# Patient Record
Sex: FEMALE | Race: WHITE | Employment: UNEMPLOYED | URBAN - NONMETROPOLITAN AREA
[De-identification: names, ages, dates, MRNs, and addresses within clinical notes are randomized per-mention and may not be internally consistent; named-entity substitution may affect disease eponyms.]

---

## 2018-04-18 ENCOUNTER — HOSPITAL ENCOUNTER (OUTPATIENT)
Dept: WOUND CARE | Age: 63
Discharge: HOME | End: 2018-04-18
Attending: NURSE PRACTITIONER | Admitting: NURSE PRACTITIONER

## 2018-04-18 VITALS
SYSTOLIC BLOOD PRESSURE: 207 MMHG | DIASTOLIC BLOOD PRESSURE: 79 MMHG | BODY MASS INDEX: 57.52 KG/M2 | HEART RATE: 80 BPM | HEIGHT: 60 IN | TEMPERATURE: 97 F | RESPIRATION RATE: 18 BRPM | WEIGHT: 293 LBS

## 2018-04-18 DIAGNOSIS — L97.921 CHRONIC ULCER OF LEFT LEG, LIMITED TO BREAKDOWN OF SKIN (HCC): ICD-10-CM

## 2018-04-18 DIAGNOSIS — I89.0 LYMPHEDEMA OF BOTH LOWER EXTREMITIES: ICD-10-CM

## 2018-04-18 PROCEDURE — 99203 OFFICE O/P NEW LOW 30 MIN: CPT | Performed by: NURSE PRACTITIONER

## 2018-04-25 ENCOUNTER — HOSPITAL ENCOUNTER (OUTPATIENT)
Dept: WOUND CARE | Age: 63
Discharge: HOME | End: 2018-04-25
Attending: NURSE PRACTITIONER | Admitting: NURSE PRACTITIONER

## 2018-04-25 VITALS
RESPIRATION RATE: 22 BRPM | TEMPERATURE: 97.6 F | DIASTOLIC BLOOD PRESSURE: 93 MMHG | HEART RATE: 66 BPM | SYSTOLIC BLOOD PRESSURE: 179 MMHG

## 2018-04-25 DIAGNOSIS — I89.0 LYMPHEDEMA OF BOTH LOWER EXTREMITIES: ICD-10-CM

## 2018-04-25 DIAGNOSIS — L97.921 CHRONIC ULCER OF LEFT LEG, LIMITED TO BREAKDOWN OF SKIN (HCC): Primary | ICD-10-CM

## 2018-04-25 PROCEDURE — 99213 OFFICE O/P EST LOW 20 MIN: CPT | Performed by: NURSE PRACTITIONER

## 2018-04-25 RX ORDER — ATENOLOL 25 MG/1
25 TABLET ORAL DAILY
COMMUNITY

## 2018-04-25 RX ORDER — IRBESARTAN 75 MG/1
75 TABLET ORAL NIGHTLY
COMMUNITY
End: 2019-06-12

## 2018-04-25 RX ORDER — LEVOTHYROXINE SODIUM 0.05 MG/1
50 TABLET ORAL DAILY
COMMUNITY

## 2018-04-25 RX ORDER — GLIPIZIDE 5 MG/1
5 TABLET ORAL
COMMUNITY

## 2018-04-25 RX ORDER — WARFARIN SODIUM 4 MG/1
5 TABLET ORAL
COMMUNITY

## 2018-04-25 ASSESSMENT — PAIN DESCRIPTION - PAIN TYPE: TYPE: ACUTE PAIN

## 2018-04-25 ASSESSMENT — PAIN SCALES - GENERAL: PAINLEVEL_OUTOF10: 8

## 2018-04-25 ASSESSMENT — PAIN DESCRIPTION - DESCRIPTORS: DESCRIPTORS: SHARP;THROBBING

## 2018-04-25 ASSESSMENT — PAIN DESCRIPTION - LOCATION: LOCATION: LEG

## 2018-04-25 ASSESSMENT — PAIN DESCRIPTION - ORIENTATION: ORIENTATION: INNER;LEFT;UPPER

## 2018-04-30 LAB
CULTURE: NORMAL
ORGANISM: NORMAL
ORGANISM: NORMAL
REPORT STATUS: NORMAL
REQUEST PROBLEM: NORMAL
SPECIMEN: NORMAL

## 2018-05-02 ENCOUNTER — HOSPITAL ENCOUNTER (OUTPATIENT)
Dept: WOUND CARE | Age: 63
Discharge: HOME | End: 2018-05-31
Attending: NURSE PRACTITIONER | Admitting: NURSE PRACTITIONER

## 2018-05-02 RX ORDER — SULFAMETHOXAZOLE AND TRIMETHOPRIM 800; 160 MG/1; MG/1
1 TABLET ORAL 2 TIMES DAILY
COMMUNITY
End: 2018-05-16

## 2018-05-09 ENCOUNTER — HOSPITAL ENCOUNTER (OUTPATIENT)
Dept: WOUND CARE | Age: 63
Discharge: HOME | End: 2018-05-09
Attending: NURSE PRACTITIONER | Admitting: NURSE PRACTITIONER

## 2018-05-09 VITALS
SYSTOLIC BLOOD PRESSURE: 179 MMHG | RESPIRATION RATE: 22 BRPM | DIASTOLIC BLOOD PRESSURE: 84 MMHG | HEART RATE: 73 BPM | TEMPERATURE: 98.4 F

## 2018-05-09 DIAGNOSIS — I89.0 LYMPHEDEMA OF BOTH LOWER EXTREMITIES: ICD-10-CM

## 2018-05-09 DIAGNOSIS — L97.921 CHRONIC ULCER OF LEFT LEG, LIMITED TO BREAKDOWN OF SKIN (HCC): Primary | ICD-10-CM

## 2018-05-09 PROCEDURE — 99213 OFFICE O/P EST LOW 20 MIN: CPT | Performed by: NURSE PRACTITIONER

## 2018-05-16 ENCOUNTER — HOSPITAL ENCOUNTER (OUTPATIENT)
Dept: WOUND CARE | Age: 63
Discharge: HOME | End: 2018-05-16
Attending: NURSE PRACTITIONER | Admitting: NURSE PRACTITIONER

## 2018-05-16 VITALS
HEART RATE: 65 BPM | SYSTOLIC BLOOD PRESSURE: 179 MMHG | TEMPERATURE: 96.4 F | RESPIRATION RATE: 22 BRPM | DIASTOLIC BLOOD PRESSURE: 96 MMHG

## 2018-05-16 DIAGNOSIS — L97.921 CHRONIC ULCER OF LEFT LEG, LIMITED TO BREAKDOWN OF SKIN (HCC): Primary | ICD-10-CM

## 2018-05-16 DIAGNOSIS — I89.0 LYMPHEDEMA OF BOTH LOWER EXTREMITIES: ICD-10-CM

## 2018-05-16 PROCEDURE — 99213 OFFICE O/P EST LOW 20 MIN: CPT | Performed by: NURSE PRACTITIONER

## 2018-05-23 ENCOUNTER — HOSPITAL ENCOUNTER (OUTPATIENT)
Dept: WOUND CARE | Age: 63
Discharge: HOME | End: 2018-05-23
Attending: NURSE PRACTITIONER | Admitting: NURSE PRACTITIONER

## 2018-05-23 VITALS
HEART RATE: 60 BPM | TEMPERATURE: 97.4 F | SYSTOLIC BLOOD PRESSURE: 154 MMHG | DIASTOLIC BLOOD PRESSURE: 88 MMHG | RESPIRATION RATE: 22 BRPM

## 2018-05-23 DIAGNOSIS — L97.921 CHRONIC ULCER OF LEFT LEG, LIMITED TO BREAKDOWN OF SKIN (HCC): Primary | ICD-10-CM

## 2018-05-23 DIAGNOSIS — I89.0 LYMPHEDEMA OF BOTH LOWER EXTREMITIES: ICD-10-CM

## 2018-05-23 PROCEDURE — 99213 OFFICE O/P EST LOW 20 MIN: CPT | Performed by: NURSE PRACTITIONER

## 2018-06-06 ENCOUNTER — HOSPITAL ENCOUNTER (OUTPATIENT)
Dept: WOUND CARE | Age: 63
Discharge: HOME | End: 2018-06-06
Attending: SURGERY | Admitting: SURGERY

## 2018-06-06 VITALS
DIASTOLIC BLOOD PRESSURE: 105 MMHG | HEART RATE: 67 BPM | RESPIRATION RATE: 20 BRPM | TEMPERATURE: 97.4 F | SYSTOLIC BLOOD PRESSURE: 178 MMHG

## 2018-06-06 DIAGNOSIS — I89.0 LYMPHEDEMA OF BOTH LOWER EXTREMITIES: Primary | ICD-10-CM

## 2018-06-06 DIAGNOSIS — L97.921 CHRONIC ULCER OF LEFT LEG, LIMITED TO BREAKDOWN OF SKIN (HCC): ICD-10-CM

## 2019-06-12 ENCOUNTER — HOSPITAL ENCOUNTER (OUTPATIENT)
Dept: WOUND CARE | Age: 64
Discharge: HOME | End: 2019-06-12
Payer: MEDICARE

## 2019-06-12 VITALS
WEIGHT: 293 LBS | SYSTOLIC BLOOD PRESSURE: 137 MMHG | HEIGHT: 60 IN | RESPIRATION RATE: 22 BRPM | BODY MASS INDEX: 57.52 KG/M2 | TEMPERATURE: 97.4 F | HEART RATE: 61 BPM | DIASTOLIC BLOOD PRESSURE: 66 MMHG

## 2019-06-12 DIAGNOSIS — L97.122 CHRONIC ULCER OF LEFT THIGH WITH FAT LAYER EXPOSED (HCC): ICD-10-CM

## 2019-06-12 DIAGNOSIS — L89.893 PRESSURE ULCER OF LOWER EXTREMITY, STAGE 3 (HCC): ICD-10-CM

## 2019-06-12 DIAGNOSIS — L97.112 CHRONIC ULCER OF RIGHT THIGH WITH FAT LAYER EXPOSED (HCC): ICD-10-CM

## 2019-06-12 DIAGNOSIS — E66.01 MORBID OBESITY (HCC): ICD-10-CM

## 2019-06-12 PROCEDURE — 99203 OFFICE O/P NEW LOW 30 MIN: CPT | Performed by: SURGERY

## 2019-06-12 PROCEDURE — 99213 OFFICE O/P EST LOW 20 MIN: CPT

## 2019-06-12 RX ORDER — LOSARTAN POTASSIUM 25 MG/1
50 TABLET ORAL 2 TIMES DAILY
COMMUNITY

## 2019-06-12 ASSESSMENT — PAIN DESCRIPTION - DESCRIPTORS: DESCRIPTORS: SORE

## 2019-06-12 ASSESSMENT — PAIN DESCRIPTION - LOCATION: LOCATION: LEG

## 2019-06-12 ASSESSMENT — PAIN DESCRIPTION - PAIN TYPE: TYPE: ACUTE PAIN

## 2019-06-12 ASSESSMENT — PAIN DESCRIPTION - ORIENTATION: ORIENTATION: LEFT;RIGHT;POSTERIOR

## 2019-06-12 ASSESSMENT — PAIN SCALES - GENERAL: PAINLEVEL_OUTOF10: 4

## 2019-06-12 ASSESSMENT — PAIN DESCRIPTION - FREQUENCY: FREQUENCY: INTERMITTENT

## 2019-06-13 NOTE — PROGRESS NOTES
FAMILY HISTORY    Family History   Problem Relation Age of Onset    Coronary Art Dis Father     Stroke Father     Coronary Art Dis Brother     Stroke Brother     Diabetes Brother     High Blood Pressure Brother        SOCIAL HISTORY    Social History     Tobacco Use    Smoking status: Never Smoker    Smokeless tobacco: Never Used   Substance Use Topics    Alcohol use: No    Drug use: No       ALLERGIES    Allergies   Allergen Reactions    Asa [Aspirin]     Celebrex [Celecoxib]     Ciprofloxacin     Lisinopril     Nabumetone     Naproxen     Nsaids     Vancomycin     Vioxx [Rofecoxib]        MEDICATIONS    Current Outpatient Medications on File Prior to Encounter   Medication Sig Dispense Refill    losartan (COZAAR) 25 MG tablet Take 25 mg by mouth daily      levothyroxine (SYNTHROID) 50 MCG tablet Take 50 mcg by mouth Daily      atenolol (TENORMIN) 25 MG tablet Take 25 mg by mouth daily      glipiZIDE (GLUCOTROL) 5 MG tablet Take 5 mg by mouth 2 times daily (before meals)      warfarin (COUMADIN) 4 MG tablet Take 3 mg by mouth        No current facility-administered medications on file prior to encounter. PROBLEM LIST    Patient Active Problem List   Diagnosis    WD - Lymphedema of both lower extremities    WD-Chronic ulcer of left leg, limited to breakdown of skin ([address]    WD - Chronic ulcer of left thigh with fat layer exposed ([address]    WD - Chronic ulcer of right thigh with fat layer exposed ([address]    Morbid obesity ([address]    WD - Pressure ulcer of lower extremity, stage 3 ([address]       REVIEW OF SYSTEMS    Pertinent items are noted in HPI.       Objective:      /66   Pulse 61   Temp 97.4 °F (36.3 °C) (Temporal)   Resp 22   Ht 5' (1.524 m)   Wt (!) 371 lb (168.3 kg)   BMI 72.46 kg/m²     PHYSICAL EXAM    General Appearance:   Alert, cooperative, no distress    Head:   Normocephalic, without obvious abnormality, atraumatic    Eyes:   PERRL, conjunctiva/corneas clear Ears:   Normal external appearance, hearing grossly intact    Nose:  Nares normal, mucosa normal, no drainage    Neck:  Supple, trachea midline    Respiratory:   Equal chest rise, respirations unlabored, no wheezing   Cardiovascular:   Regular rate and rhythm    Abdomen, GI:   Soft, non-tender, no rebound or guarding, morbidly obese    Musculoskeletal:  Marked lymphedema and obesity of the bilateral lower extremities. Scar over the left knee. Neurologic:  Nonfocal, strength & sensation grossly intact   Psychiatric: Oriented x3, grossly appropriate       Dermatologic exam: Visual inspection of the periwound reveals the skin to be coarse, atrophic and hyperpigmented  Wound exam: see wound description below in procedure note      Assessment:       Crista Wells  appears to have a non-healing wound of the left lower leg and bilateral upper posterior thighs. The etiology of the wound is felt to be pressure and lymphedema. There are multiple complicating factors including edema, lymphedema, diabetes, chronic pressure, decreased mobility, shear force, obesity and tape sensitivity. A comprehensive wound management program would be helpful to heal this wound. Assessments completed include fall risk and nutritional, functional,and psychological status. At this time appropriate care would include: periodic debridement and wound care as below. Problem List Items Addressed This Visit     WD - Chronic ulcer of left thigh with fat layer exposed ([address]    WD - Chronic ulcer of right thigh with fat layer exposed ([address]    Morbid obesity ([address]    WD - Pressure ulcer of lower extremity, stage 3 ([address]          Plan:   Stop using the hydrocortisone cream. Use a silicone based dressing to avoid tape sensitivity. Will try local wound treatment, potentially consider lymphedema therapy if not responding to treatment.     Discharge instructions:    Discharge Instructions       PHYSICIAN ORDERS AND DISCHARGE INSTRUCTIONS    NOTE: Upon discharge from the [address], you will receive a patient experience survey. We would be grateful if you would take the time to fill this survey out. Wound care order history:     CARROL's   Right       Left    Date not able to obtain   Vascular studies:   Date    Imaging:   Date    Cultures:   Date ???   Labs/ HbA1c:   Date    Grafts:  Date    HBO:  To be determined   Antibiotics:               Earlier Wound care treatments:                Authorizations:      Consults:   Date     PCP: Johana Arevalo    Continuing wound care orders and information:              Residence: home              Continue home health care with: [place]   Your wound-care supplies will be provided by: Mirian MORALES provider:   Compression with sister helping sister to obtain a pair at this time 6/12/19   Off loading:  Date    Wound Meds:    Wound cleansing:      Do not scrub or use excessive force. Wash hands with soap and water before and after dressing changes. Prior to applying a clean dressing, cleanse wound with normal saline,    wound cleanser, or mild soap and water. Ask your physician or nurse before getting the wound(s) wet in the shower. Daily Wound management:    Keep weight off wounds and reposition every 2 hours. Avoid standing for long periods of time. Apply wraps/stockings in AM and remove at bedtime. Elevate legs to the level of the heart or above for 30 minutes 4-5 times a day and/or when sitting. When taking antibiotics take entire prescription as ordered by MD do not stop taking until medicine is all gone.            Orders for this week: 6/12/19     Left posterior leg and left lower buttocks--apply medihoney, ca alginate, mepilex border or Keramax dressing--change dressing every other day    Left and right thigh--apply desitin or barrier cream to areas, cover with mepilex border or keramax dressing (silicone adhesive dressing)--change dressing every other day and as needed or loose or soiled dressing    Consider lymph pumps in the near future    Work on getting gel cushion for recliner---in the mean time please try pillow to recliner    ----homecare to order border dressings please---      Follow up with Dr Lopez Bolton  In 1 week in the wound care center    Call [phone] for any questions or concerns.     Date__________   Time____________                Treatment Note      Written Patient Dismissal Instructions Given            Electronically signed by Eric Mccann MD on 6/12/2019 at 8:36 PM

## 2019-06-19 ENCOUNTER — HOSPITAL ENCOUNTER (OUTPATIENT)
Dept: WOUND CARE | Age: 64
Discharge: HOME | End: 2019-06-19
Payer: MEDICARE

## 2019-06-19 VITALS — RESPIRATION RATE: 20 BRPM | TEMPERATURE: 97.6 F

## 2019-06-19 DIAGNOSIS — E66.01 MORBID OBESITY (HCC): ICD-10-CM

## 2019-06-19 DIAGNOSIS — L89.893 PRESSURE ULCER OF LOWER EXTREMITY, STAGE 3 (HCC): ICD-10-CM

## 2019-06-19 DIAGNOSIS — I89.0 LYMPHEDEMA OF BOTH LOWER EXTREMITIES: Primary | ICD-10-CM

## 2019-06-19 PROCEDURE — 87073 CULTURE BACTERIA ANAEROBIC: CPT

## 2019-06-19 PROCEDURE — 87186 SC STD MICRODIL/AGAR DIL: CPT

## 2019-06-19 PROCEDURE — 99212 OFFICE O/P EST SF 10 MIN: CPT | Performed by: NURSE PRACTITIONER

## 2019-06-19 PROCEDURE — 87071 CULTURE AEROBIC QUANT OTHER: CPT

## 2019-06-19 PROCEDURE — 87077 CULTURE AEROBIC IDENTIFY: CPT

## 2019-06-19 PROCEDURE — 99213 OFFICE O/P EST LOW 20 MIN: CPT

## 2019-06-19 ASSESSMENT — PAIN DESCRIPTION - DESCRIPTORS: DESCRIPTORS: SORE

## 2019-06-19 ASSESSMENT — PAIN DESCRIPTION - PAIN TYPE: TYPE: ACUTE PAIN

## 2019-06-19 ASSESSMENT — PAIN SCALES - GENERAL: PAINLEVEL_OUTOF10: 2

## 2019-06-19 ASSESSMENT — PAIN DESCRIPTION - FREQUENCY: FREQUENCY: INTERMITTENT

## 2019-06-19 ASSESSMENT — PAIN DESCRIPTION - ORIENTATION: ORIENTATION: RIGHT;POSTERIOR

## 2019-06-19 ASSESSMENT — PAIN DESCRIPTION - LOCATION: LOCATION: BUTTOCKS

## 2019-06-19 NOTE — PROGRESS NOTES
Wound Care Center Progress Note       Acey Kocher  AGE: 59 y.o. GENDER: female  : 1955  TODAY'S DATE:  2019        Subjective:     Chief Complaint   Patient presents with    Wound Check     buttock          HISTORY of PRESENT ILLNESS     Acey Kocher is a 59 y.o. female who presents today for wound evaluation of Chronic pressure and lymphedema ulcer(s) of left posterior lower leg. The ulcer is of moderate severity. The underlying cause of the wound is lymphedema and pressure. Attempted to drain hematoma to right posterior thigh and large amount of blood was expressed. Bleeding stopped by pressure. There is a hematoma to the left posterior thigh which is larger than one on right but given bleeding to right posterior thigh will monitor for now and allow for reabsorption. Patient is on Coumadin and bleeds easily. Wound Pain Timing/Severity: none  Quality of pain: N/A  Severity of pain:  0 / 10   Modifying Factors: lymphedema, diabetes, chronic pressure, decreased mobility and obesity  Associated Signs/Symptoms: edema and drainage        PAST MEDICAL HISTORY        Diagnosis Date    Cellulitis     Diabetes mellitus ([address]     Gallbladder disease     Gallbladder sludge     Hx of blood clots     Lymphedema     Pulmonary embolism (HCC)     Renal failure     Saddle embolus     Thrombosis        PAST SURGICAL HISTORY    Past Surgical History:   Procedure Laterality Date    CARDIAC SURGERY      IVC filter placement     COSMETIC SURGERY      JOINT REPLACEMENT      Bilateral partial knee        FAMILY HISTORY    Family History   Problem Relation Age of Onset    Coronary Art Dis Father     Stroke Father     Coronary Art Dis Brother     Stroke Brother     Diabetes Brother     High Blood Pressure Brother        SOCIAL HISTORY    Social History     Tobacco Use    Smoking status: Never Smoker    Smokeless tobacco: Never Used   Substance Use Topics    Alcohol use: No    Drug use:  No Healing % -29 6/19/2019  9:51 AM   Distance Tunneling (cm) 0 cm 6/19/2019  9:51 AM   Tunneling Position ___ O'Clock 0 6/19/2019  9:51 AM   Undermining Starts ___ O'Clock 0 6/19/2019  9:51 AM   Undermining Ends___ O'Clock 0 6/19/2019  9:51 AM   Undermining Maxium Distance (cm) 0 6/19/2019  9:51 AM   Wound Assessment Red 6/19/2019  9:51 AM   Drainage Amount Moderate 6/19/2019  9:51 AM   Drainage Description Serosanguinous 6/19/2019  9:51 AM   Odor None 6/19/2019  9:51 AM   Margins Defined edges 6/19/2019  9:51 AM   Vera-wound Assessment Pink 6/19/2019  9:51 AM   Non-staged Wound Description Full thickness 6/19/2019  9:51 AM   [place]%Wound Bed 0 6/19/2019  9:51 AM   Red%Wound Bed 100 6/19/2019  9:51 AM   Yellow%Wound Bed 0 6/19/2019  9:51 AM   Black%Wound Bed 0 6/19/2019  9:51 AM   Purple%Wound Bed 0 6/19/2019  9:51 AM   Other%Wound Bed 0 6/19/2019  9:51 AM   Number of days: 6       Wound 06/12/19 #3 Left lower buttocks pressure stage III (Active)   Wound Image   6/12/2019  1:04 PM   Wound Pressure Stage  3 6/19/2019  9:51 AM   Dressing Status Clean;Dry; Intact 6/12/2019  2:30 PM   Dressing Changed Changed/New 6/12/2019  2:30 PM   Wound Cleansed Rinsed/Irrigated with saline 6/19/2019  9:51 AM   Wound Length (cm) 0 cm 6/19/2019  9:51 AM   Wound Width (cm) 0 cm 6/19/2019  9:51 AM   Wound Depth (cm) 0 cm 6/19/2019  9:51 AM   Wound Surface Area (cm^2) 0 cm^2 6/19/2019  9:51 AM   Change in Wound Size % (l*w) 100 6/19/2019  9:51 AM   Wound Volume (cm^3) 0 cm^3 6/19/2019  9:51 AM   Wound Healing % 100 6/19/2019  9:51 AM   Distance Tunneling (cm) 0 cm 6/19/2019  9:51 AM   Tunneling Position ___ O'Clock 0 6/19/2019  9:51 AM   Undermining Starts ___ O'Clock 0 6/19/2019  9:51 AM   Undermining Ends___ O'Clock 0 6/19/2019  9:51 AM   Undermining Maxium Distance (cm) 0 6/19/2019  9:51 AM   Wound Assessment Purple 6/19/2019  9:51 AM   Drainage Amount None 6/19/2019  9:51 AM   Odor None 6/19/2019  9:51 AM   Margins Undefined edges 6/19/2019  9:51 AM   Vera-wound Assessment Clean;Dry 6/19/2019  9:51 AM   Non-staged Wound Description Not applicable 9/76/1455  6:74 AM   [place]%Wound Bed 0 6/19/2019  9:51 AM   Red%Wound Bed 0 6/19/2019  9:51 AM   Yellow%Wound Bed 0 6/19/2019  9:51 AM   Black%Wound Bed 0 6/19/2019  9:51 AM   Purple%Wound Bed 100 6/19/2019  9:51 AM   Other%Wound Bed 0 6/19/2019  9:51 AM   Number of days: 6       Wound 06/12/19 #4 left posterior thigh --pressure stage III (Active)   Wound Image   6/12/2019  1:04 PM   Wound Pressure Stage  3 6/19/2019  9:51 AM   Dressing Status Clean;Dry; Intact 6/12/2019  2:30 PM   Dressing Changed Changed/New 6/12/2019  2:30 PM   Wound Cleansed Rinsed/Irrigated with saline 6/19/2019  9:51 AM   Wound Length (cm) 0 cm 6/19/2019  9:51 AM   Wound Width (cm) 0 cm 6/19/2019  9:51 AM   Wound Depth (cm) 0 cm 6/19/2019  9:51 AM   Wound Surface Area (cm^2) 0 cm^2 6/19/2019  9:51 AM   Change in Wound Size % (l*w) 100 6/19/2019  9:51 AM   Wound Volume (cm^3) 0 cm^3 6/19/2019  9:51 AM   Wound Healing % 100 6/19/2019  9:51 AM   Distance Tunneling (cm) 0 cm 6/19/2019  9:51 AM   Tunneling Position ___ O'Clock 0 6/19/2019  9:51 AM   Undermining Starts ___ O'Clock 0 6/19/2019  9:51 AM   Undermining Ends___ O'Clock 0 6/19/2019  9:51 AM   Undermining Maxium Distance (cm) 0 6/19/2019  9:51 AM   Wound Assessment Purple 6/19/2019  9:51 AM   Drainage Amount None 6/19/2019  9:51 AM   Drainage Description Serosanguinous 6/12/2019  1:04 PM   Odor None 6/19/2019  9:51 AM   Margins Undefined edges 6/19/2019  9:51 AM   Vera-wound Assessment Clean;Dry 6/19/2019  9:51 AM   Non-staged Wound Description Not applicable 2/67/9413  1:72 AM   [place]%Wound Bed 0 6/19/2019  9:51 AM   Red%Wound Bed 0 6/19/2019  9:51 AM   Yellow%Wound Bed 0 6/19/2019  9:51 AM   Black%Wound Bed 0 6/19/2019  9:51 AM   Purple%Wound Bed 100 6/19/2019  9:51 AM   Other%Wound Bed 0 6/12/2019  1:04 PM   Number of days: 6       Wound 06/12/19 #5 right posterior thigh --pressure stage III (Active)   Wound Image   6/12/2019  1:04 PM   Wound Pressure Stage  3 6/19/2019  9:51 AM   Dressing Status Clean;Dry; Intact 6/12/2019  2:30 PM   Dressing Changed Changed/New 6/12/2019  2:30 PM   Wound Cleansed Rinsed/Irrigated with saline 6/12/2019  1:04 PM   Wound Length (cm) 0 cm 6/19/2019  9:51 AM   Wound Width (cm) 0 cm 6/19/2019  9:51 AM   Wound Depth (cm) 0 cm 6/19/2019  9:51 AM   Wound Surface Area (cm^2) 0 cm^2 6/19/2019  9:51 AM   Change in Wound Size % (l*w) 100 6/19/2019  9:51 AM   Wound Volume (cm^3) 0 cm^3 6/19/2019  9:51 AM   Wound Healing % 100 6/19/2019  9:51 AM   Distance Tunneling (cm) 0 cm 6/19/2019  9:51 AM   Tunneling Position ___ O'Clock 0 6/19/2019  9:51 AM   Undermining Starts ___ O'Clock 0 6/19/2019  9:51 AM   Undermining Ends___ O'Clock 0 6/19/2019  9:51 AM   Undermining Maxium Distance (cm) 0 6/19/2019  9:51 AM   Wound Assessment Purple;Red 6/19/2019  9:51 AM   Drainage Amount None 6/19/2019  9:51 AM   Drainage Description Serosanguinous 6/12/2019  1:04 PM   Odor None 6/19/2019  9:51 AM   Margins Attached edges 6/19/2019  9:51 AM   Vera-wound Assessment Clean;Dry;Pink 6/19/2019  9:51 AM   Non-staged Wound Description Full thickness 6/19/2019  9:51 AM   [place]%Wound Bed 0 6/19/2019  9:51 AM   Red%Wound Bed 50 6/19/2019  9:51 AM   Yellow%Wound Bed 0 6/19/2019  9:51 AM   Black%Wound Bed 0 6/19/2019  9:51 AM   Purple%Wound Bed 50 6/19/2019  9:51 AM   Other%Wound Bed 0 6/19/2019  9:51 AM   Number of days: 6       Assessment:       Problem List Items Addressed This Visit     WD - Lymphedema of both lower extremities - Primary    Morbid obesity ([address]    WD - Pressure ulcer of lower extremity, stage 3 ([facility]    Relevant Orders    WOUND CULTURE          Status of wound progress and description from last visit:   Improving, several areas are closed. She continues to have open area to the left posterior lower leg.         Plan:     Discharge Instructions            Plan:   Stop using the hydrocortisone cream. Use a silicone based dressing to avoid tape sensitivity. Will try local wound treatment, potentially consider lymphedema therapy if not responding to treatment.     Discharge instructions:     Discharge Instructions        PHYSICIAN ORDERS AND DISCHARGE INSTRUCTIONS     NOTE: Upon discharge from the [address], you will receive a patient experience survey. We would be grateful if you would take the time to fill this survey out.     Wound care order history:                 CARROL's   Right       Left               Date not able to obtain              Vascular studies:   Date               Imaging:   Date               Cultures:   Date ? ??              Labs/ HbA1c:   Date               Grafts:  Date               HBO:  To be determined              Antibiotics:               Earlier Wound care treatments:                Authorizations:                        Consults:   Date                           PCP: Joseph     Continuing wound care orders and information:              Residence: home              Continue home health care with: [place]              Your wound-care supplies will be provided by: Alicia MORALES provider:              Compression with sister helping sister to obtain a pair at this time 6/12/19              Off loading:  Date               Wound Meds:              Wound cleansing:                           Do not scrub or use excessive force. Wash hands with soap and water before and after dressing changes. Prior to applying a clean dressing, cleanse wound with normal saline,                          wound cleanser, or mild soap and water. Ask your physician or nurse before getting the wound(s) wet in the shower. Daily Wound management:                          Keep weight off wounds and reposition every 2 hours. Avoid standing for long periods of time. Apply wraps/stockings in AM and remove at bedtime. Elevate legs to the level of the heart or above for 30 minutes 4-5 times a day and/or when sitting.                                                When taking antibiotics take entire prescription as ordered by MD do not stop taking until medicine is all gone.                                                                 Orders for this week: 6/19/19                 Left posterior leg --apply medihoney, ca alginate, mepilex border or Keramax dressing--change dressing every other day     Left and right thigh--apply desitin or barrier cream to areas, cover with mepilex border or keramax dressing (silicone adhesive dressing)--change dressing every other day and as needed or loose or soiled dressing     Consider lymph pumps in the near future     Culture obtained left leg 6/19/19  Work on getting gel cushion for recliner---in the mean time please try pillow to recliner     ----homecare to order border dressings please---        Follow up with Nayana DAS In 2 weeks in the wound care center     Call [phone] for any questions or concerns.     Date__________   Time____________                          Treatment Note      Written Patient Dismissal Instructions Given            Electronically signed by MIA Coulter CNP on 6/19/2019 at 10:32 AM

## 2019-06-22 LAB
CULTURE: ABNORMAL
Lab: ABNORMAL
SPECIMEN: ABNORMAL

## 2019-07-03 ENCOUNTER — HOSPITAL ENCOUNTER (OUTPATIENT)
Dept: WOUND CARE | Age: 64
Discharge: HOME | End: 2019-07-03
Payer: MEDICARE

## 2019-07-03 VITALS
DIASTOLIC BLOOD PRESSURE: 78 MMHG | HEART RATE: 55 BPM | TEMPERATURE: 97.4 F | SYSTOLIC BLOOD PRESSURE: 153 MMHG | RESPIRATION RATE: 20 BRPM

## 2019-07-03 DIAGNOSIS — I89.0 LYMPHEDEMA OF BOTH LOWER EXTREMITIES: ICD-10-CM

## 2019-07-03 DIAGNOSIS — L89.893 PRESSURE ULCER OF LOWER EXTREMITY, STAGE 3 (HCC): ICD-10-CM

## 2019-07-03 DIAGNOSIS — E66.01 MORBID OBESITY (HCC): Primary | ICD-10-CM

## 2019-07-03 PROCEDURE — 99212 OFFICE O/P EST SF 10 MIN: CPT | Performed by: NURSE PRACTITIONER

## 2019-07-03 PROCEDURE — 99213 OFFICE O/P EST LOW 20 MIN: CPT

## 2019-07-03 RX ORDER — DOXYCYCLINE HYCLATE 100 MG
100 TABLET ORAL 2 TIMES DAILY
Qty: 20 TABLET | Refills: 0 | Status: SHIPPED | OUTPATIENT
Start: 2019-07-03 | End: 2019-07-13

## 2019-07-10 ENCOUNTER — HOSPITAL ENCOUNTER (OUTPATIENT)
Dept: WOUND CARE | Age: 64
Discharge: HOME | End: 2019-07-10
Payer: MEDICARE

## 2019-07-10 VITALS
DIASTOLIC BLOOD PRESSURE: 85 MMHG | SYSTOLIC BLOOD PRESSURE: 143 MMHG | TEMPERATURE: 97.2 F | HEART RATE: 57 BPM | RESPIRATION RATE: 18 BRPM

## 2019-07-10 DIAGNOSIS — I89.0 LYMPHEDEMA OF BOTH LOWER EXTREMITIES: ICD-10-CM

## 2019-07-10 DIAGNOSIS — L89.893 PRESSURE ULCER OF LOWER EXTREMITY, STAGE 3 (HCC): ICD-10-CM

## 2019-07-10 DIAGNOSIS — E66.01 MORBID OBESITY (HCC): Primary | ICD-10-CM

## 2019-07-10 PROCEDURE — 99213 OFFICE O/P EST LOW 20 MIN: CPT

## 2019-07-10 PROCEDURE — 99212 OFFICE O/P EST SF 10 MIN: CPT | Performed by: NURSE PRACTITIONER

## 2019-07-10 ASSESSMENT — PAIN SCALES - GENERAL: PAINLEVEL_OUTOF10: 0

## 2019-07-10 NOTE — PROGRESS NOTES
[facility]              Your wound-care supplies will be provided by: Alexus Tilley provider:  Meliton Davis with sister helping sister to obtain a pair at this time 6/12/19              Off loading:  Date               AGSGY Meds:              JOSE MU cleansing:                           BL not scrub or use excessive force.                          Wash hands with soap and water before and after dressing changes.                         Prior to applying a clean dressing, cleanse wound with normal saline,                          wound cleanser, or mild soap and water.                           Ask your physician or nurse before getting the wound(s) wet in the shower.              Daily Wound management:                          Keep weight off wounds and reposition every 2 hours.                          Avoid standing for long periods of time.                          Apply wraps/stockings in AM and remove at bedtime.                          Elevate legs to the level of the heart or above for 30 minutes 4-5 times a day and/or when sitting.                                               When taking antibiotics take entire prescription as ordered by MD do not stop taking until medicine is all gone.                                                                 Orders for this week: 7/10/19                 Left posterior leg --bactroban to wound bed then pack to depth of 3.5 cm with damp sorbact rope, cover with  Ag, alginate, cover with abd pad and wrap with conform and tape. --change dressing every day     Consider lymph pumps in the near future     Culture obtained left leg 6/19/19.  Started on Bactrim DS   Work on getting gel cushion for recliner---in the mean time please try pillow to recliner     ----homecare to order border dressings please---        Follow up with Nayana DAS In 2 week in the wound care center     Call [phone] for any questions or concerns.     Date__________

## 2019-07-24 ENCOUNTER — HOSPITAL ENCOUNTER (OUTPATIENT)
Dept: WOUND CARE | Age: 64
Discharge: HOME | End: 2019-07-24
Payer: MEDICARE

## 2019-07-24 VITALS — RESPIRATION RATE: 18 BRPM | SYSTOLIC BLOOD PRESSURE: 164 MMHG | DIASTOLIC BLOOD PRESSURE: 99 MMHG | TEMPERATURE: 96.8 F

## 2019-07-24 DIAGNOSIS — I89.0 LYMPHEDEMA OF BOTH LOWER EXTREMITIES: Primary | ICD-10-CM

## 2019-07-24 DIAGNOSIS — L89.893 PRESSURE ULCER OF LOWER EXTREMITY, STAGE 3 (HCC): ICD-10-CM

## 2019-07-24 PROBLEM — L97.921 CHRONIC ULCER OF LEFT LEG, LIMITED TO BREAKDOWN OF SKIN (HCC): Status: RESOLVED | Noted: 2018-04-18 | Resolved: 2019-07-24

## 2019-07-24 PROCEDURE — 99213 OFFICE O/P EST LOW 20 MIN: CPT

## 2019-07-24 PROCEDURE — 99212 OFFICE O/P EST SF 10 MIN: CPT | Performed by: NURSE PRACTITIONER

## 2019-07-24 RX ORDER — CLOTRIMAZOLE AND BETAMETHASONE DIPROPIONATE 10; .64 MG/G; MG/G
CREAM TOPICAL
Qty: 45 G | Refills: 2 | Status: SHIPPED | OUTPATIENT
Start: 2019-07-24

## 2019-07-24 RX ORDER — AMOXICILLIN AND CLAVULANATE POTASSIUM 875; 125 MG/1; MG/1
1 TABLET, FILM COATED ORAL 2 TIMES DAILY
Qty: 20 TABLET | Refills: 0 | Status: SHIPPED | OUTPATIENT
Start: 2019-07-24 | End: 2019-08-03

## 2019-07-24 NOTE — PROGRESS NOTES
Wound Care Center Progress Note       Cyrus Lara  AGE: 59 y.o. GENDER: female  : 1955  TODAY'S DATE:  2019        Subjective:     Chief Complaint   Patient presents with    Wound Check     POSTERIOR LEGS         HISTORY of PRESENT ILLNESS     Cyrus Lara is a 59 y.o. female who presents today for wound evaluation of Chronic lymphedema ulcer(s) of left posterior lower leg. The ulcer is of mild severity. The underlying cause of the wound is lymphedema. She has been treated for lymphedema wounds here in the wound clinic starting in 2018. The current wound continues to have small amount of purulent drainage despite patient having completed PO Bactrim and doxycycline. Wound culture with only scanty MRSA growth sensitive to tetracycline and bactrim. Will start on Augmentin. If she continues to have purulent drainage will refer to ID. Wound Pain Timing/Severity: none  Quality of pain: N/A  Severity of pain:  0 / 10   Modifying Factors: edema, lymphedema, diabetes and obesity  Associated Signs/Symptoms: drainage        PAST MEDICAL HISTORY        Diagnosis Date    Cellulitis     Diabetes mellitus ([address]     Gallbladder disease     Gallbladder sludge     Hx of blood clots     Lymphedema     Pulmonary embolism (HCC)     Renal failure     Saddle embolus     Thrombosis        PAST SURGICAL HISTORY    Past Surgical History:   Procedure Laterality Date    CARDIAC SURGERY      IVC filter placement     COSMETIC SURGERY      JOINT REPLACEMENT      Bilateral partial knee        FAMILY HISTORY    Family History   Problem Relation Age of Onset    Coronary Art Dis Father     Stroke Father     Coronary Art Dis Brother     Stroke Brother     Diabetes Brother     High Blood Pressure Brother        SOCIAL HISTORY    Social History     Tobacco Use    Smoking status: Never Smoker    Smokeless tobacco: Never Used   Substance Use Topics    Alcohol use: No    Drug use:  No 10:03 AM   Wound Healing % 86 7/24/2019 10:03 AM   Distance Tunneling (cm) 0 cm 7/24/2019 10:03 AM   Tunneling Position ___ O'Clock 0 7/24/2019 10:03 AM   Undermining Starts ___ O'Clock 0 7/24/2019 10:03 AM   Undermining Ends___ O'Clock 0 7/24/2019 10:03 AM   Undermining Maxium Distance (cm) 0 7/24/2019 10:03 AM   Wound Assessment Red 7/24/2019 10:03 AM   Drainage Amount Moderate 7/24/2019 10:03 AM   Drainage Description Yellow 7/24/2019 10:03 AM   Odor None 7/24/2019 10:03 AM   Margins Defined edges 7/24/2019 10:03 AM   Vera-wound Assessment Pink 7/24/2019 10:03 AM   Non-staged Wound Description Full thickness 7/24/2019 10:03 AM   [place]%Wound Bed 0 7/24/2019 10:03 AM   Red%Wound Bed 100 7/24/2019 10:03 AM   Yellow%Wound Bed 0 7/24/2019 10:03 AM   Black%Wound Bed 0 7/24/2019 10:03 AM   Purple%Wound Bed 0 7/24/2019 10:03 AM   Other%Wound Bed 0 7/24/2019 10:03 AM   Number of days: 41       Assessment:     Problem List Items Addressed This Visit     WD - Lymphedema of both lower extremities - Primary    WD - Pressure ulcer of lower extremity, stage 3 ([facility] [address]          Status of wound progress and description from last visit:   Size is improving but she continues to have purulent drainage. Plan:     Discharge Instructions       PHYSICIAN ORDERS AND DISCHARGE INSTRUCTIONS     NOTE: Upon discharge from the [address], you will receive a patient experience survey.  We would be grateful if you would take the time to fill this survey out.     Wound care order history:                 CARROL's   Right       Left               Date not able to obtain              Vascular studies:   Date               Imaging:   Date               Cultures:   Date               Labs/ HbA1c:   Date               Grafts:  Date               HBO:  To be determined              Antibiotics:  AUGMENTIN BID FOR 10 DAYS ON 7/24/19              Earlier Wound care treatments:                Authorizations:                        Consults:   Date recliner     ----homecare to order border dressings please---        Follow up with Nayana DAS In 2 week in the wound care center     Call [phone] for any questions or concerns.     Date__________   Time____________        Treatment Note      Written Patient Dismissal Instructions Given            Electronically signed by MIA Pal CNP on 7/24/2019 at 11:29 AM

## 2019-07-31 ENCOUNTER — HOSPITAL ENCOUNTER (OUTPATIENT)
Dept: WOUND CARE | Age: 64
Discharge: HOME | End: 2019-07-31
Payer: MEDICARE

## 2019-07-31 VITALS
DIASTOLIC BLOOD PRESSURE: 84 MMHG | TEMPERATURE: 97.5 F | SYSTOLIC BLOOD PRESSURE: 169 MMHG | RESPIRATION RATE: 18 BRPM | HEART RATE: 56 BPM

## 2019-07-31 DIAGNOSIS — I89.0 LYMPHEDEMA OF BOTH LOWER EXTREMITIES: Primary | ICD-10-CM

## 2019-07-31 DIAGNOSIS — L97.922 ULCER OF LEFT LOWER LEG, WITH FAT LAYER EXPOSED (HCC): ICD-10-CM

## 2019-07-31 PROBLEM — L89.893 PRESSURE ULCER OF LOWER EXTREMITY, STAGE 3 (HCC): Status: RESOLVED | Noted: 2019-06-12 | Resolved: 2019-07-31

## 2019-07-31 PROCEDURE — 99213 OFFICE O/P EST LOW 20 MIN: CPT

## 2019-07-31 PROCEDURE — 99212 OFFICE O/P EST SF 10 MIN: CPT | Performed by: NURSE PRACTITIONER

## 2019-07-31 ASSESSMENT — PAIN SCALES - GENERAL: PAINLEVEL_OUTOF10: 0

## 2019-07-31 NOTE — PROGRESS NOTES
please---        Follow up with Nayana DAS In 1 week in the wound care center     Call [phone] for any questions or concerns.     Date__________   Time____________        Treatment Note      Written Patient Dismissal Instructions Given            Electronically signed by MIA Prado CNP on 7/31/2019 at 11:14 AM

## 2019-08-07 ENCOUNTER — HOSPITAL ENCOUNTER (OUTPATIENT)
Dept: WOUND CARE | Age: 64
Discharge: HOME | End: 2019-08-07
Payer: MEDICARE

## 2019-08-07 VITALS
TEMPERATURE: 96.9 F | SYSTOLIC BLOOD PRESSURE: 179 MMHG | HEART RATE: 56 BPM | DIASTOLIC BLOOD PRESSURE: 89 MMHG | RESPIRATION RATE: 18 BRPM

## 2019-08-07 DIAGNOSIS — L97.922 ULCER OF LEFT LOWER LEG, WITH FAT LAYER EXPOSED (HCC): ICD-10-CM

## 2019-08-07 DIAGNOSIS — I87.312 CHRONIC VENOUS HYPERTENSION (IDIOPATHIC) WITH ULCER OF LEFT LOWER EXTREMITY (HCC): ICD-10-CM

## 2019-08-07 DIAGNOSIS — I89.0 LYMPHEDEMA OF BOTH LOWER EXTREMITIES: Primary | ICD-10-CM

## 2019-08-07 DIAGNOSIS — L97.929 CHRONIC VENOUS HYPERTENSION (IDIOPATHIC) WITH ULCER OF LEFT LOWER EXTREMITY (HCC): ICD-10-CM

## 2019-08-07 PROCEDURE — 99213 OFFICE O/P EST LOW 20 MIN: CPT

## 2019-08-07 PROCEDURE — 99212 OFFICE O/P EST SF 10 MIN: CPT | Performed by: NURSE PRACTITIONER

## 2019-08-07 ASSESSMENT — PAIN SCALES - GENERAL: PAINLEVEL_OUTOF10: 0

## 2019-08-07 NOTE — PROGRESS NOTES
MEDICATIONS    Current Outpatient Medications on File Prior to Encounter   Medication Sig Dispense Refill    clotrimazole-betamethasone (LOTRISONE) 1-0.05 % cream Apply topically 2 times daily. 45 g 2    losartan (COZAAR) 25 MG tablet Take 50 mg by mouth 2 times daily       levothyroxine (SYNTHROID) 50 MCG tablet Take 50 mcg by mouth Daily      atenolol (TENORMIN) 25 MG tablet Take 25 mg by mouth daily      glipiZIDE (GLUCOTROL) 5 MG tablet Take 5 mg by mouth 2 times daily (before meals)      warfarin (COUMADIN) 4 MG tablet Take 3 mg by mouth        No current facility-administered medications on file prior to encounter. REVIEW OF SYSTEMS    Pertinent items are noted in HPI. Constitutional: Negative for systemic symptoms including fever, chills and malaise. Objective:      BP (!) 179/89   Pulse 56   Temp 96.9 °F (36.1 °C) (Temporal)   Resp 18     PHYSICAL EXAM  Extremities: 3 + edema-  bilateral lower legs and venous stasis dermatosis noted    General: The patient is in no acute distress. Mental status:  Patient is appropriate, is  oriented to place and plan of care. Dermatologic exam: Visual inspection of the periwound reveals the skin to be edematous. Wound exam:  see wound description below     All active wounds listed below with today's date are evaluated      Wound 06/12/19 Wound #2 Left Post. Lower Leg (Onset x 2 Weeks)--lymphademia (Active)   Wound Image   7/3/2019 10:53 AM   Wound Other 8/7/2019  9:38 AM   Dressing Status Clean;Dry; Intact 7/31/2019 11:19 AM   Dressing Changed Changed/New 7/31/2019 11:19 AM   Wound Cleansed Rinsed/Irrigated with saline 8/7/2019  9:38 AM   Wound Length (cm) 0.1 cm 8/7/2019  9:38 AM   Wound Width (cm) 0.1 cm 8/7/2019  9:38 AM   Wound Depth (cm) 0.1 cm 8/7/2019  9:38 AM   Wound Surface Area (cm^2) 0.01 cm^2 8/7/2019  9:38 AM   Change in Wound Size % (l*w) 98.57 8/7/2019  9:38 AM   Wound Volume (cm^3) 0 cm^3 8/7/2019  9:38 AM   Wound Healing % 100 8/7/2019  9:38 AM   Distance Tunneling (cm) 0 cm 8/7/2019  9:38 AM   Tunneling Position ___ O'Clock 0 8/7/2019  9:38 AM   Undermining Starts ___ O'Clock 0 8/7/2019  9:38 AM   Undermining Ends___ O'Clock 0 8/7/2019  9:38 AM   Undermining Maxium Distance (cm) 0 8/7/2019  9:38 AM   Wound Assessment Pink 8/7/2019  9:38 AM   Drainage Amount None 8/7/2019  9:38 AM   Drainage Description Yellow 7/31/2019 10:30 AM   Odor None 8/7/2019  9:38 AM   Margins Defined edges 8/7/2019  9:38 AM   Vera-wound Assessment Pink 8/7/2019  9:38 AM   Non-staged Wound Description Full thickness 8/7/2019  9:38 AM   [place]%Wound Bed 100 8/7/2019  9:38 AM   Red%Wound Bed 0 8/7/2019  9:38 AM   Yellow%Wound Bed 0 8/7/2019  9:38 AM   Black%Wound Bed 0 8/7/2019  9:38 AM   Purple%Wound Bed 0 8/7/2019  9:38 AM   Other%Wound Bed 0 8/7/2019  9:38 AM   Number of days: 55       Assessment:     Problem List Items Addressed This Visit     WD - Lymphedema of both lower extremities - Primary    WD-Ulcer of left lower leg, with fat layer exposed ([address]    Chronic venous hypertension (idiopathic) with ulcer of left lower extremity ([address]          Status of wound progress and description from last visit:   Improving with less drainage  today. Plan:     Discharge Instructions         Plan:      Discharge Instructions        PHYSICIAN ORDERS AND DISCHARGE INSTRUCTIONS     NOTE: Upon discharge from the [address], you will receive a patient experience survey.  We would be grateful if you would take the time to fill this survey out.     Wound care order history:                 CARROL's   Right       Left               Date not able to obtain              Vascular studies:   Date               Imaging:   Date               Cultures:   Date               Labs/ HbA1c:   Date               Grafts:  Date               HBO:  To be determined              Antibiotics:  AUGMENTIN BID FOR 10 DAYS ON 7/24/19              Earlier Wound care treatments:                Authorizations:                        Consults:   Date                           PCP: [id number] Are[address]     Continuing wound care orders and information:              Residence: home              Continue home health care with: [facility]              Your wound-care supplies will be provided by: Patrice Borja provider:  Belinda Fisher with sister helping sister to obtain a pair at this time 6/12/19              Off loading:  Date               EJXAS Meds: ORDERED LOTRISONE TO POSTERIOR THIGHS ON 7/24/19              Wound cleansing:                           NZ not scrub or use excessive force.                          Wash hands with soap and water before and after dressing changes.                         Prior to applying a clean dressing, cleanse wound with normal saline,                          wound cleanser, or mild soap and water.                           Ask your physician or nurse before getting the wound(s) wet in the shower.              Daily Wound management:                          Keep weight off wounds and reposition every 2 hours.                          Avoid standing for long periods of time.                          Apply wraps/stockings in AM and remove at bedtime.                          Elevate legs to the level of the heart or above for 30 minutes 4-5 times a day and/or when sitting.                                               When taking antibiotics take entire prescription as ordered by MD do not stop taking until medicine is all gone.                                                                 Orders for this week: 8/7/19                 Left posterior leg -- apply damp chiara  to wound bed, cover with  Ag, alginate, cover with abd pad and wrap with conform and tape. --change dressing every day.     Consider lymph pumps in the near future. Paper sent for approval      Culture obtained left leg 6/19/19.  Started on Bactrim DS   Work on getting gel cushion

## 2019-08-21 ENCOUNTER — HOSPITAL ENCOUNTER (OUTPATIENT)
Dept: WOUND CARE | Age: 64
Discharge: HOME | End: 2019-08-21
Payer: MEDICARE

## 2019-08-21 VITALS
TEMPERATURE: 97.4 F | SYSTOLIC BLOOD PRESSURE: 158 MMHG | DIASTOLIC BLOOD PRESSURE: 82 MMHG | HEART RATE: 52 BPM | RESPIRATION RATE: 20 BRPM

## 2019-08-21 DIAGNOSIS — L97.922 ULCER OF LEFT LOWER LEG, WITH FAT LAYER EXPOSED (HCC): ICD-10-CM

## 2019-08-21 DIAGNOSIS — L97.929 CHRONIC VENOUS HYPERTENSION (IDIOPATHIC) WITH ULCER OF LEFT LOWER EXTREMITY (HCC): Primary | ICD-10-CM

## 2019-08-21 DIAGNOSIS — I89.0 LYMPHEDEMA OF BOTH LOWER EXTREMITIES: ICD-10-CM

## 2019-08-21 DIAGNOSIS — I87.312 CHRONIC VENOUS HYPERTENSION (IDIOPATHIC) WITH ULCER OF LEFT LOWER EXTREMITY (HCC): Primary | ICD-10-CM

## 2019-08-21 PROCEDURE — 99212 OFFICE O/P EST SF 10 MIN: CPT | Performed by: NURSE PRACTITIONER

## 2019-08-21 PROCEDURE — 99213 OFFICE O/P EST LOW 20 MIN: CPT

## 2019-08-21 NOTE — PROGRESS NOTES
receive a patient experience survey. We would be grateful if you would take the time to fill this survey out.     Wound care order history:                 CARROL's   Right       Left               Date not able to obtain              Vascular studies:   Date               Imaging:   Date               Cultures:   Date               Labs/ HbA1c:   Date               Grafts:  Date               HBO:  To be determined              Antibiotics:  AUGMENTIN BID FOR 10 DAYS ON 7/24/19              Earlier Wound care treatments:                Authorizations:                        Consults:   Date                           PCP: Johana Arevalo     Continuing wound care orders and information:              Residence: home              Continue home health care with: [place] [id number]              Your wound-care supplies will be provided by: Alexus Tilley provider:  Meliton Davis with sister helping sister to obtain a pair at this time 6/12/19              Off loading:  Date               TNUGO Meds: ORDERED LOTRISONE TO POSTERIOR THIGHS ON 7/24/19              Wound cleansing:                           BN not scrub or use excessive force.                          Wash hands with soap and water before and after dressing changes.                           Prior to applying a clean dressing, cleanse wound with normal saline,                          wound cleanser, or mild soap and water.                           Ask your physician or nurse before getting the wound(s) wet in the shower.              Daily Wound management:                          Keep weight off wounds and reposition every 2 hours.                          Avoid standing for long periods of time.                          FAVSY wraps/stockings in AM and remove at bedtime.                          Elevate legs to the level of the heart or above for 30 minutes 4-5 times a day and/or when sitting.                                               When taking

## 2019-09-11 ENCOUNTER — HOSPITAL ENCOUNTER (OUTPATIENT)
Dept: WOUND CARE | Age: 64
Discharge: HOME | End: 2019-09-11
Payer: MEDICARE

## 2019-09-11 VITALS
TEMPERATURE: 98.1 F | SYSTOLIC BLOOD PRESSURE: 161 MMHG | DIASTOLIC BLOOD PRESSURE: 96 MMHG | RESPIRATION RATE: 22 BRPM | HEART RATE: 61 BPM

## 2019-09-11 DIAGNOSIS — L97.922 ULCER OF LEFT LOWER LEG, WITH FAT LAYER EXPOSED (HCC): ICD-10-CM

## 2019-09-11 DIAGNOSIS — I87.312 CHRONIC VENOUS HYPERTENSION (IDIOPATHIC) WITH ULCER OF LEFT LOWER EXTREMITY (HCC): Primary | ICD-10-CM

## 2019-09-11 DIAGNOSIS — L97.929 CHRONIC VENOUS HYPERTENSION (IDIOPATHIC) WITH ULCER OF LEFT LOWER EXTREMITY (HCC): Primary | ICD-10-CM

## 2019-09-11 PROCEDURE — 99213 OFFICE O/P EST LOW 20 MIN: CPT

## 2019-09-11 PROCEDURE — 99212 OFFICE O/P EST SF 10 MIN: CPT | Performed by: NURSE PRACTITIONER

## 2019-09-11 NOTE — PROGRESS NOTES
Wound Care Center Progress Note       Elba Beltran  AGE: 59 y.o. GENDER: female  : 1955  TODAY'S DATE:  2019        Subjective:     Chief Complaint   Patient presents with    Wound Check     left post leg         HISTORY of PRESENT ILLNESS     Elba Beltran is a 59 y.o. female who presents today for wound evaluation of Chronic venous and lymphedema ulcer(s) of left posterior lower leg. The ulcer is of moderate severity. The underlying cause of the wound is edema.    Wound Pain Timing/Severity: none  Quality of pain: N/A  Severity of pain:  0 / 10   Modifying Factors: edema, venous stasis, lymphedema and obesity  Associated Signs/Symptoms: edema and drainage        PAST MEDICAL HISTORY        Diagnosis Date    Cellulitis     Diabetes mellitus ([address]     Gallbladder disease     Gallbladder sludge     Hx of blood clots     Lymphedema     Pulmonary embolism (HCC)     Renal failure     Saddle embolus     Thrombosis        PAST SURGICAL HISTORY    Past Surgical History:   Procedure Laterality Date    CARDIAC SURGERY      IVC filter placement     COSMETIC SURGERY      JOINT REPLACEMENT      Bilateral partial knee        FAMILY HISTORY    Family History   Problem Relation Age of Onset    Coronary Art Dis Father     Stroke Father     Coronary Art Dis Brother     Stroke Brother     Diabetes Brother     High Blood Pressure Brother        SOCIAL HISTORY    Social History     Tobacco Use    Smoking status: Never Smoker    Smokeless tobacco: Never Used   Substance Use Topics    Alcohol use: No    Drug use: No       ALLERGIES    Allergies   Allergen Reactions    Asa [Aspirin]     Celebrex [Celecoxib]     Ciprofloxacin     Lisinopril     Nabumetone     Naproxen     Nsaids     Vancomycin     Vioxx [Rofecoxib]        MEDICATIONS    Current Outpatient Medications on File Prior to Encounter   Medication Sig Dispense Refill    clotrimazole-betamethasone (LOTRISONE) 1-0.05 % cream daily. Consider lymph pumps in the near future. Paper sent for approval         Work on getting gel cushion for recliner---in the mean time please try pillow to recliner     ----homecare to order border dressings and silver alginate, abd pads and paper tape  please---        Follow up with Nayana DAS In 2 weeks in the wound care center     Call [phone] for any questions or concerns.     Date__________   Time____________          Treatment Note [REMOVED] Wound 06/12/19 Wound #2 Left Post. Lower Leg (Onset x 2 Weeks)--lymphademia-Dressing/Treatment: (silver alginate.  sacral border. )    Written Patient Dismissal Instructions Given            Electronically signed by MIA Rand CNP on 9/11/2019 at 10:42 AM

## 2019-10-02 ENCOUNTER — HOSPITAL ENCOUNTER (OUTPATIENT)
Dept: WOUND CARE | Age: 64
Discharge: HOME | End: 2019-10-02
Payer: MEDICARE

## 2019-10-02 VITALS
TEMPERATURE: 97.6 F | RESPIRATION RATE: 20 BRPM | HEART RATE: 56 BPM | DIASTOLIC BLOOD PRESSURE: 98 MMHG | SYSTOLIC BLOOD PRESSURE: 155 MMHG

## 2019-10-02 DIAGNOSIS — I89.0 LYMPHEDEMA OF BOTH LOWER EXTREMITIES: ICD-10-CM

## 2019-10-02 DIAGNOSIS — L97.922 ULCER OF LEFT LOWER LEG, WITH FAT LAYER EXPOSED (HCC): ICD-10-CM

## 2019-10-02 DIAGNOSIS — I87.312 CHRONIC VENOUS HYPERTENSION (IDIOPATHIC) WITH ULCER OF LEFT LOWER EXTREMITY (HCC): Primary | ICD-10-CM

## 2019-10-02 DIAGNOSIS — L97.929 CHRONIC VENOUS HYPERTENSION (IDIOPATHIC) WITH ULCER OF LEFT LOWER EXTREMITY (HCC): Primary | ICD-10-CM

## 2019-10-02 PROCEDURE — 99213 OFFICE O/P EST LOW 20 MIN: CPT

## 2019-10-02 PROCEDURE — 99212 OFFICE O/P EST SF 10 MIN: CPT | Performed by: NURSE PRACTITIONER

## 2019-10-16 ENCOUNTER — HOSPITAL ENCOUNTER (OUTPATIENT)
Dept: WOUND CARE | Age: 64
Discharge: HOME | End: 2019-10-16
Payer: MEDICARE

## 2019-10-16 VITALS
TEMPERATURE: 98 F | HEART RATE: 65 BPM | DIASTOLIC BLOOD PRESSURE: 92 MMHG | RESPIRATION RATE: 22 BRPM | SYSTOLIC BLOOD PRESSURE: 181 MMHG

## 2019-10-16 DIAGNOSIS — I87.312 CHRONIC VENOUS HYPERTENSION (IDIOPATHIC) WITH ULCER OF LEFT LOWER EXTREMITY (HCC): Primary | ICD-10-CM

## 2019-10-16 DIAGNOSIS — L97.929 CHRONIC VENOUS HYPERTENSION (IDIOPATHIC) WITH ULCER OF LEFT LOWER EXTREMITY (HCC): Primary | ICD-10-CM

## 2019-10-16 DIAGNOSIS — I89.0 LYMPHEDEMA OF BOTH LOWER EXTREMITIES: ICD-10-CM

## 2019-10-16 DIAGNOSIS — I87.321 CHRONIC VENOUS HYPERTENSION (IDIOPATHIC) WITH INFLAMMATION OF RIGHT LOWER EXTREMITY: ICD-10-CM

## 2019-10-16 DIAGNOSIS — L97.922 ULCER OF LEFT LOWER LEG, WITH FAT LAYER EXPOSED (HCC): ICD-10-CM

## 2019-10-16 PROCEDURE — 99212 OFFICE O/P EST SF 10 MIN: CPT | Performed by: NURSE PRACTITIONER

## 2019-10-16 PROCEDURE — 99213 OFFICE O/P EST LOW 20 MIN: CPT

## 2019-11-06 ENCOUNTER — HOSPITAL ENCOUNTER (OUTPATIENT)
Dept: WOUND CARE | Age: 64
Discharge: HOME | End: 2019-11-06
Payer: MEDICARE

## 2019-11-06 VITALS
SYSTOLIC BLOOD PRESSURE: 154 MMHG | DIASTOLIC BLOOD PRESSURE: 86 MMHG | RESPIRATION RATE: 20 BRPM | HEART RATE: 61 BPM | TEMPERATURE: 97.6 F

## 2019-11-06 DIAGNOSIS — I89.0 LYMPHEDEMA OF BOTH LOWER EXTREMITIES: ICD-10-CM

## 2019-11-06 DIAGNOSIS — L97.922 ULCER OF LEFT LOWER LEG, WITH FAT LAYER EXPOSED (HCC): Primary | ICD-10-CM

## 2019-11-06 PROCEDURE — 99213 OFFICE O/P EST LOW 20 MIN: CPT

## 2019-11-06 PROCEDURE — 99212 OFFICE O/P EST SF 10 MIN: CPT | Performed by: NURSE PRACTITIONER

## 2019-11-27 ENCOUNTER — HOSPITAL ENCOUNTER (OUTPATIENT)
Dept: WOUND CARE | Age: 64
Discharge: HOME | End: 2019-11-27
Payer: MEDICARE

## 2019-11-27 VITALS
SYSTOLIC BLOOD PRESSURE: 180 MMHG | DIASTOLIC BLOOD PRESSURE: 98 MMHG | RESPIRATION RATE: 22 BRPM | HEART RATE: 65 BPM | TEMPERATURE: 97 F

## 2019-11-27 DIAGNOSIS — I87.321 CHRONIC VENOUS HYPERTENSION (IDIOPATHIC) WITH INFLAMMATION OF RIGHT LOWER EXTREMITY: ICD-10-CM

## 2019-11-27 DIAGNOSIS — I87.312 CHRONIC VENOUS HYPERTENSION (IDIOPATHIC) WITH ULCER OF LEFT LOWER EXTREMITY (HCC): Primary | ICD-10-CM

## 2019-11-27 DIAGNOSIS — I87.2 CHRONIC VENOUS INSUFFICIENCY: ICD-10-CM

## 2019-11-27 DIAGNOSIS — L97.929 CHRONIC VENOUS HYPERTENSION (IDIOPATHIC) WITH ULCER OF LEFT LOWER EXTREMITY (HCC): Primary | ICD-10-CM

## 2019-11-27 DIAGNOSIS — L03.115 CELLULITIS OF RIGHT LOWER LEG: ICD-10-CM

## 2019-11-27 DIAGNOSIS — I89.0 LYMPHEDEMA OF BOTH LOWER EXTREMITIES: ICD-10-CM

## 2019-11-27 PROCEDURE — 99213 OFFICE O/P EST LOW 20 MIN: CPT

## 2019-11-27 PROCEDURE — 99213 OFFICE O/P EST LOW 20 MIN: CPT | Performed by: NURSE PRACTITIONER

## 2019-11-27 RX ORDER — SULFAMETHOXAZOLE AND TRIMETHOPRIM 800; 160 MG/1; MG/1
1 TABLET ORAL 2 TIMES DAILY
Qty: 20 TABLET | Refills: 0 | Status: SHIPPED | OUTPATIENT
Start: 2019-11-27 | End: 2019-12-07

## 2019-12-04 ENCOUNTER — HOSPITAL ENCOUNTER (OUTPATIENT)
Dept: WOUND CARE | Age: 64
Discharge: HOME | End: 2019-12-04
Payer: MEDICARE

## 2019-12-04 VITALS
RESPIRATION RATE: 16 BRPM | SYSTOLIC BLOOD PRESSURE: 145 MMHG | DIASTOLIC BLOOD PRESSURE: 80 MMHG | HEART RATE: 57 BPM | TEMPERATURE: 98 F

## 2019-12-04 DIAGNOSIS — I89.0 LYMPHEDEMA OF BOTH LOWER EXTREMITIES: ICD-10-CM

## 2019-12-04 DIAGNOSIS — I87.2 CHRONIC VENOUS INSUFFICIENCY: Primary | ICD-10-CM

## 2019-12-04 DIAGNOSIS — L03.115 CELLULITIS OF RIGHT LOWER LEG: ICD-10-CM

## 2019-12-04 PROCEDURE — 99213 OFFICE O/P EST LOW 20 MIN: CPT

## 2019-12-04 PROCEDURE — 99213 OFFICE O/P EST LOW 20 MIN: CPT | Performed by: NURSE PRACTITIONER

## 2019-12-18 ENCOUNTER — HOSPITAL ENCOUNTER (OUTPATIENT)
Dept: WOUND CARE | Age: 64
Discharge: HOME | End: 2019-12-18
Payer: MEDICARE

## 2019-12-18 VITALS — SYSTOLIC BLOOD PRESSURE: 138 MMHG | TEMPERATURE: 97.4 F | DIASTOLIC BLOOD PRESSURE: 78 MMHG | RESPIRATION RATE: 17 BRPM

## 2019-12-18 DIAGNOSIS — L97.922 ULCER OF LEFT LOWER LEG, WITH FAT LAYER EXPOSED (HCC): ICD-10-CM

## 2019-12-18 DIAGNOSIS — I87.2 CHRONIC VENOUS INSUFFICIENCY: Primary | ICD-10-CM

## 2019-12-18 DIAGNOSIS — I89.0 LYMPHEDEMA OF BOTH LOWER EXTREMITIES: ICD-10-CM

## 2019-12-18 DIAGNOSIS — I87.312 CHRONIC VENOUS HYPERTENSION (IDIOPATHIC) WITH ULCER OF LEFT LOWER EXTREMITY (HCC): ICD-10-CM

## 2019-12-18 DIAGNOSIS — L97.929 CHRONIC VENOUS HYPERTENSION (IDIOPATHIC) WITH ULCER OF LEFT LOWER EXTREMITY (HCC): ICD-10-CM

## 2019-12-18 PROCEDURE — 99213 OFFICE O/P EST LOW 20 MIN: CPT | Performed by: NURSE PRACTITIONER

## 2019-12-18 PROCEDURE — 99213 OFFICE O/P EST LOW 20 MIN: CPT

## 2020-01-08 ENCOUNTER — HOSPITAL ENCOUNTER (OUTPATIENT)
Dept: WOUND CARE | Age: 65
Discharge: HOME | End: 2020-01-08
Payer: MEDICARE

## 2020-01-08 VITALS
HEART RATE: 55 BPM | DIASTOLIC BLOOD PRESSURE: 84 MMHG | TEMPERATURE: 96.3 F | SYSTOLIC BLOOD PRESSURE: 160 MMHG | RESPIRATION RATE: 16 BRPM

## 2020-01-08 PROCEDURE — 99213 OFFICE O/P EST LOW 20 MIN: CPT | Performed by: NURSE PRACTITIONER

## 2020-01-08 PROCEDURE — 99213 OFFICE O/P EST LOW 20 MIN: CPT

## 2020-01-08 ASSESSMENT — PAIN DESCRIPTION - PAIN TYPE: TYPE: CHRONIC PAIN

## 2020-01-08 ASSESSMENT — PAIN SCALES - GENERAL: PAINLEVEL_OUTOF10: 5

## 2020-01-08 ASSESSMENT — PAIN DESCRIPTION - FREQUENCY: FREQUENCY: INTERMITTENT

## 2020-01-08 ASSESSMENT — PAIN DESCRIPTION - LOCATION: LOCATION: LEG

## 2020-01-08 ASSESSMENT — PAIN - FUNCTIONAL ASSESSMENT: PAIN_FUNCTIONAL_ASSESSMENT: PREVENTS OR INTERFERES SOME ACTIVE ACTIVITIES AND ADLS

## 2020-01-08 ASSESSMENT — PAIN DESCRIPTION - DESCRIPTORS: DESCRIPTORS: SORE

## 2020-01-08 NOTE — PROGRESS NOTES
1-0.05 % cream Apply topically 2 times daily. 45 g 2    losartan (COZAAR) 25 MG tablet Take 50 mg by mouth 2 times daily       levothyroxine (SYNTHROID) 50 MCG tablet Take 50 mcg by mouth Daily      atenolol (TENORMIN) 25 MG tablet Take 25 mg by mouth daily      glipiZIDE (GLUCOTROL) 5 MG tablet Take 5 mg by mouth 2 times daily (before meals)      warfarin (COUMADIN) 4 MG tablet Take 5 mg by mouth        No current facility-administered medications on file prior to encounter. REVIEW OF SYSTEMS    Pertinent items are noted in HPI. Constitutional: Negative for systemic symptoms including fever, chills and malaise. Objective:      BP (!) 160/84   Pulse 55   Temp 96.3 °F (35.7 °C) (Temporal)   Resp 16     PHYSICAL EXAM    General: The patient is in no acute distress. Mental status:  Patient is appropriate, is  oriented to place and plan of care. Dermatologic exam: Visual inspection of the periwound reveals the skin to be sclerotic and edematous. Wound exam:  see wound description below     All active wounds listed below with today's date are evaluated      Wound 06/12/19 Wound #2 Left Post. Lower Leg (Onset x 2 Weeks)--lymphademia (Active)   Wound Image   1/8/2020 10:37 AM   Wound Venous 1/8/2020 10:37 AM   Offloading for Diabetic Foot Ulcers No 12/18/2019 10:43 AM   Dressing Status Clean;Dry; Intact 12/18/2019 11:10 AM   Dressing Changed Changed/New 12/18/2019 11:10 AM   Dressing/Treatment ABD; Alginate 12/18/2019 11:10 AM   Wound Cleansed Wound cleanser 1/8/2020 10:37 AM   Wound Length (cm) 2.5 cm 1/8/2020 10:37 AM   Wound Width (cm) 3.5 cm 1/8/2020 10:37 AM   Wound Depth (cm) 0.1 cm 1/8/2020 10:37 AM   Wound Surface Area (cm^2) 8.75 cm^2 1/8/2020 10:37 AM   Change in Wound Size % (l*w) -1150 1/8/2020 10:37 AM   Wound Volume (cm^3) 0.88 cm^3 1/8/2020 10:37 AM   Wound Healing % -1157 1/8/2020 10:37 AM   Distance Tunneling (cm) 0 cm 1/8/2020 10:37 AM   Tunneling Position ___ O'Clock 0 1/8/2020 10:37 AM   Undermining Starts ___ O'Clock 0 1/8/2020 10:37 AM   Undermining Ends___ O'Clock 0 1/8/2020 10:37 AM   Undermining Maxium Distance (cm) 0 1/8/2020 10:37 AM   Wound Assessment Red 1/8/2020 10:37 AM   Drainage Amount Large 1/8/2020 10:37 AM   Drainage Description Serosanguinous 1/8/2020 10:37 AM   Odor None 1/8/2020 10:37 AM   Margins Attached edges 1/8/2020 10:37 AM   Vera-wound Assessment Red 1/8/2020 10:37 AM   Non-staged Wound Description Full thickness 1/8/2020 10:37 AM   [place]%Wound Bed 0 1/8/2020 10:37 AM   Red%Wound Bed 100 1/8/2020 10:37 AM   Yellow%Wound Bed 0 1/8/2020 10:37 AM   Black%Wound Bed 0 1/8/2020 10:37 AM   Purple%Wound Bed 0 1/8/2020 10:37 AM   Other%Wound Bed 0 1/8/2020 10:37 AM   Number of days: 209       Assessment:     Problem List Items Addressed This Visit     WD - Lymphedema of both lower extremities    Chronic venous hypertension (idiopathic) with inflammation of right lower extremity    WD-Chronic venous insufficiency - Primary          Status of wound progress and description from last visit: Worse today with increased drainage. Will check on status of lymph edema pumps. Plan:     Discharge Instructions       PHYSICIAN ORDERS AND DISCHARGE INSTRUCTIONS     NOTE: Upon discharge from the [address], you will receive a patient experience survey.  We would be grateful if you would take the time to fill this survey out.     Wound care order history:                 CARROL's   Right       Left               Date not able to obtain              Vascular studies:   Date               Imaging:   Date               Cultures:   Date               Labs/ HbA1c:   Date               Grafts:  Date               HBO:  To be determined              Antibiotics:  AUGMENTIN BID FOR 10 DAYS ON 7/24/19              Earlier Wound care treatments:                Authorizations:                        Consults:   Date                           PCP: Johana Arevalo     Continuing wound care orders and

## 2020-01-22 ENCOUNTER — HOSPITAL ENCOUNTER (OUTPATIENT)
Dept: WOUND CARE | Age: 65
Discharge: HOME | End: 2020-01-22
Payer: MEDICARE

## 2020-01-22 VITALS
TEMPERATURE: 97.5 F | DIASTOLIC BLOOD PRESSURE: 66 MMHG | RESPIRATION RATE: 15 BRPM | SYSTOLIC BLOOD PRESSURE: 101 MMHG | HEART RATE: 60 BPM

## 2020-01-22 PROCEDURE — 99213 OFFICE O/P EST LOW 20 MIN: CPT | Performed by: NURSE PRACTITIONER

## 2020-01-22 PROCEDURE — 99213 OFFICE O/P EST LOW 20 MIN: CPT

## 2020-01-22 RX ORDER — CLOTRIMAZOLE AND BETAMETHASONE DIPROPIONATE 10; .64 MG/G; MG/G
CREAM TOPICAL
Qty: 45 G | Refills: 2 | Status: SHIPPED | OUTPATIENT
Start: 2020-01-22

## 2020-01-22 RX ORDER — SULFAMETHOXAZOLE AND TRIMETHOPRIM 800; 160 MG/1; MG/1
1 TABLET ORAL 2 TIMES DAILY
Qty: 20 TABLET | Refills: 0 | Status: SHIPPED | OUTPATIENT
Start: 2020-01-22 | End: 2020-02-01

## 2020-01-22 ASSESSMENT — PAIN DESCRIPTION - ORIENTATION: ORIENTATION: RIGHT

## 2020-01-22 ASSESSMENT — PAIN DESCRIPTION - LOCATION: LOCATION: LEG

## 2020-01-22 ASSESSMENT — PAIN DESCRIPTION - DESCRIPTORS: DESCRIPTORS: BURNING;DISCOMFORT

## 2020-01-22 NOTE — PLAN OF CARE
Problem: Pain:  Intervention: Opioid analgesia side-effects  Note:   See flow sheet   Intervention: Assess barriers to pain control  Note:   See flow sheet   Intervention: Promote participation in pain management plan  Note:   See flow sheet      Problem: Wound:  Goal: Will show signs of wound healing; wound closure and no evidence of infection  Description  Will show signs of wound healing; wound closure and no evidence of infection   Note:   See flow sheet   Intervention: Assess ankle, calf, or foot circumference blilaterally  Note:   See flow sheet   Intervention: Assess pain status  Note:   See flow sheet   Intervention: Assess wound size, appearance and drainage  Note:   See flow sheet   Intervention: Assess pedal pulses bilaterally if patient has a foot or leg ulcer  Note:   See flow sheet   Intervention: Doppler if unable to palpate pedal pulse  Note:   See flow sheet

## 2020-01-29 ENCOUNTER — HOSPITAL ENCOUNTER (OUTPATIENT)
Dept: WOUND CARE | Age: 65
Discharge: HOME | End: 2020-01-29
Payer: MEDICARE

## 2020-01-29 VITALS
HEART RATE: 69 BPM | SYSTOLIC BLOOD PRESSURE: 170 MMHG | TEMPERATURE: 97.5 F | DIASTOLIC BLOOD PRESSURE: 101 MMHG | RESPIRATION RATE: 20 BRPM

## 2020-01-29 PROCEDURE — 99213 OFFICE O/P EST LOW 20 MIN: CPT

## 2020-01-29 PROCEDURE — 99213 OFFICE O/P EST LOW 20 MIN: CPT | Performed by: NURSE PRACTITIONER

## 2020-01-29 ASSESSMENT — PAIN - FUNCTIONAL ASSESSMENT: PAIN_FUNCTIONAL_ASSESSMENT: PREVENTS OR INTERFERES SOME ACTIVE ACTIVITIES AND ADLS

## 2020-01-29 ASSESSMENT — PAIN DESCRIPTION - ORIENTATION: ORIENTATION: RIGHT

## 2020-01-29 ASSESSMENT — PAIN DESCRIPTION - FREQUENCY: FREQUENCY: INTERMITTENT

## 2020-01-29 ASSESSMENT — PAIN DESCRIPTION - ONSET: ONSET: ON-GOING

## 2020-01-29 ASSESSMENT — PAIN DESCRIPTION - LOCATION: LOCATION: LEG

## 2020-01-29 ASSESSMENT — PAIN DESCRIPTION - PROGRESSION: CLINICAL_PROGRESSION: GRADUALLY WORSENING

## 2020-01-29 ASSESSMENT — PAIN DESCRIPTION - PAIN TYPE: TYPE: CHRONIC PAIN

## 2020-01-29 ASSESSMENT — PAIN DESCRIPTION - DESCRIPTORS: DESCRIPTORS: SORE

## 2020-01-29 NOTE — PROGRESS NOTES
sulfamethoxazole-trimethoprim (BACTRIM DS;SEPTRA DS) 800-160 MG per tablet Take 1 tablet by mouth 2 times daily for 10 days 20 tablet 0    clotrimazole-betamethasone (LOTRISONE) 1-0.05 % cream Apply topically 2 times daily. 45 g 2    clotrimazole-betamethasone (LOTRISONE) 1-0.05 % cream Apply topically 2 times daily. 45 g 2    losartan (COZAAR) 25 MG tablet Take 50 mg by mouth 2 times daily       levothyroxine (SYNTHROID) 50 MCG tablet Take 50 mcg by mouth Daily      atenolol (TENORMIN) 25 MG tablet Take 25 mg by mouth daily      glipiZIDE (GLUCOTROL) 5 MG tablet Take 5 mg by mouth 2 times daily (before meals)      warfarin (COUMADIN) 4 MG tablet Take 5 mg by mouth        No current facility-administered medications on file prior to encounter. REVIEW OF SYSTEMS    Pertinent items are noted in HPI. Constitutional: Negative for systemic symptoms including fever, chills and malaise. Objective:      BP (!) 170/101   Pulse 69   Temp 97.5 °F (36.4 °C) (Temporal)   Resp 20     PHYSICAL EXAM    General: The patient is in no acute distress. Mental status:  Patient is appropriate, is  oriented to place and plan of care. Dermatologic exam: Visual inspection of the periwound reveals the skin to be sclerotic and edematous. Wound exam:  see wound description below     All active wounds listed below with today's date are evaluated      Wound 06/12/19 Wound #2 Left Post. Lower Leg (Onset x 2 Weeks)--lymphademia (Active)   Wound Image   1/8/2020 10:37 AM   Wound Traumatic 1/22/2020 10:30 AM   Offloading for Diabetic Foot Ulcers No 12/18/2019 10:43 AM   Dressing Status Clean;Dry; Intact 1/22/2020 11:25 AM   Dressing Changed Changed/New 1/22/2020 11:25 AM   Wound Cleansed Wound cleanser 1/22/2020 10:30 AM   Wound Length (cm) 1.5 cm 1/22/2020 10:30 AM   Wound Width (cm) 3 cm 1/22/2020 10:30 AM   Wound Depth (cm) 0.1 cm 1/22/2020 10:30 AM   Wound Surface Area (cm^2) 4.5 cm^2 1/22/2020 10:30 AM   Change in Wound Size % (l*w) -542.86 1/22/2020 10:30 AM   Wound Volume (cm^3) 0.45 cm^3 1/22/2020 10:30 AM   Wound Healing % -543 1/22/2020 10:30 AM   Distance Tunneling (cm) 0 cm 1/22/2020 10:30 AM   Tunneling Position ___ O'Clock 0 1/22/2020 10:30 AM   Undermining Starts ___ O'Clock 0 1/22/2020 10:30 AM   Undermining Ends___ O'Clock 0 1/22/2020 10:30 AM   Undermining Maxium Distance (cm) 0 1/22/2020 10:30 AM   Wound Assessment Red 1/22/2020 10:30 AM   Drainage Amount Large 1/22/2020 10:30 AM   Drainage Description Clear 1/22/2020 10:30 AM   Odor None 1/22/2020 10:30 AM   Margins Attached edges 1/22/2020 10:30 AM   Vera-wound Assessment Pink 1/22/2020 10:30 AM   Non-staged Wound Description Full thickness 1/22/2020 10:30 AM   [place]%Wound Bed 0 1/22/2020 10:30 AM   Red%Wound Bed 100 1/22/2020 10:30 AM   Yellow%Wound Bed 0 1/22/2020 10:30 AM   Black%Wound Bed 0 1/22/2020 10:30 AM   Purple%Wound Bed 0 1/22/2020 10:30 AM   Other%Wound Bed 0 1/22/2020 10:30 AM   Number of days: 230       Wound 01/22/20 Leg Right;Lateral #6 rt lateral lower leg (Active)   Dressing Status Clean;Dry; Intact 1/22/2020 11:25 AM   Dressing Changed Changed/New 1/22/2020 11:25 AM   Wound Length (cm) 1 cm 1/22/2020 10:30 AM   Wound Width (cm) 1.2 cm 1/22/2020 10:30 AM   Wound Depth (cm) 0.1 cm 1/22/2020 10:30 AM   Wound Surface Area (cm^2) 1.2 cm^2 1/22/2020 10:30 AM   Wound Volume (cm^3) 0.12 cm^3 1/22/2020 10:30 AM   Distance Tunneling (cm) 0 cm 1/22/2020 10:30 AM   Tunneling Position ___ O'Clock 0 1/22/2020 10:30 AM   Undermining Starts ___ O'Clock 0 1/22/2020 10:30 AM   Undermining Ends___ O'Clock 0 1/22/2020 10:30 AM   Undermining Maxium Distance (cm) 0 1/22/2020 10:30 AM   Wound Assessment Red 1/22/2020 10:30 AM   Drainage Amount Moderate 1/22/2020 10:30 AM   Drainage Description Serosanguinous 1/22/2020 10:30 AM   Odor None 1/22/2020 10:30 AM   Margins Defined edges 1/22/2020 10:30 AM   Vera-wound Assessment Pink 1/22/2020 10:30 AM   Non-staged Wound cleansing:                           YX not scrub or use excessive force.                          Wash hands with soap and water before and after dressing changes.                         Prior to applying a clean dressing, cleanse wound with normal saline,                          wound cleanser, or mild soap and water.                           Ask your physician or nurse before getting the wound(s) wet in the shower.              Daily Wound management:                          Keep weight off wounds and reposition every 2 hours.                          Avoid standing for long periods of time.                          Apply wraps/stockings in AM and remove at bedtime.                          Elevate legs to the level of the heart or above for 30 minutes 4-5 times a day and/or when sitting.                                               When taking antibiotics take entire prescription as ordered by MD do not stop taking until medicine is all gone.                                                                 Orders for this week: 1/29/20                 Left posterior leg -- Wash with Alpha Bath. paint with betadine then apply calcium alginate to excoriated areas, cover with optifoam gentle SA 6x6. ( kerramax sacral border at home) . Cover with abd and hyperfix tape. Change daily.      Lymph Pumps will check status.  Received      Bactrim ds ordered bid times 10 days      lotrisone cream         Follow up with Sally DAS In 2 weeks in the wound care center     Call [phone] for any questions or concerns.     Date__________   Time____________          Treatment Note      Written Patient Dismissal Instructions Given            Electronically signed by MIA West CNP on 1/29/2020 at 10:38 AM

## 2020-02-12 ENCOUNTER — HOSPITAL ENCOUNTER (OUTPATIENT)
Dept: WOUND CARE | Age: 65
Discharge: HOME | End: 2020-02-12
Payer: MEDICARE

## 2020-02-12 VITALS
SYSTOLIC BLOOD PRESSURE: 141 MMHG | HEART RATE: 83 BPM | RESPIRATION RATE: 18 BRPM | TEMPERATURE: 97.2 F | DIASTOLIC BLOOD PRESSURE: 93 MMHG

## 2020-02-12 PROCEDURE — 99213 OFFICE O/P EST LOW 20 MIN: CPT | Performed by: NURSE PRACTITIONER

## 2020-02-12 PROCEDURE — 99212 OFFICE O/P EST SF 10 MIN: CPT

## 2020-02-12 NOTE — PLAN OF CARE
Problem: Wound:  Goal: Will show signs of wound healing; wound closure and no evidence of infection  Description  Will show signs of wound healing; wound closure and no evidence of infection   Outcome: Completed     Problem: Wound:  Intervention: Assess ankle, calf, or foot circumference blilaterally  Note:   SEE FLOWSHEET    Intervention: Assess pain status  Note:   SEE FLOWSHEET    Intervention: Assess wound size, appearance and drainage  Note:   SEE FLOWSHEET    Intervention: Assess pedal pulses bilaterally if patient has a foot or leg ulcer  Note:   SEE FLOWSHEET    Intervention: Doppler if unable to palpate pedal pulse  Note:   SEE FLOWSHEET

## 2020-02-12 NOTE — PROGRESS NOTES
Wound Care Center Progress Note       Azeb Mosquera  AGE: 59 y.o. GENDER: female  : 1955  TODAY'S DATE:  2020        Subjective:     Chief Complaint   Patient presents with    Wound Check     Right post leg         HISTORY of PRESENT ILLNESS     Azeb Mosquera is a 59 y.o. female who presents today for wound evaluation of Chronic venous and lymphedema ulcer of the posterior left thigh. The ulcer is of moderate severity. The underlying cause of the wound is venous and lymphedema.     Wound Pain Timing/Severity: none  Quality of pain: N/A  Severity of pain:  0 / 10   Modifying Factors: edema, venous stasis, lymphedema and obesity  Associated Signs/Symptoms: edema, erythema and drainage        PAST MEDICAL HISTORY        Diagnosis Date    Cellulitis     Diabetes mellitus ([address]     Gallbladder disease     Gallbladder sludge     Hx of blood clots     Lymphedema     Pulmonary embolism (HCC)     Renal failure     Saddle embolus     Thrombosis        PAST SURGICAL HISTORY    Past Surgical History:   Procedure Laterality Date    CARDIAC SURGERY      IVC filter placement     COSMETIC SURGERY      JOINT REPLACEMENT      Bilateral partial knee        FAMILY HISTORY    Family History   Problem Relation Age of Onset    Coronary Art Dis Father     Stroke Father     Coronary Art Dis Brother     Stroke Brother     Diabetes Brother     High Blood Pressure Brother        SOCIAL HISTORY    Social History     Tobacco Use    Smoking status: Never Smoker    Smokeless tobacco: Never Used   Substance Use Topics    Alcohol use: No    Drug use: No       ALLERGIES    Allergies   Allergen Reactions    Asa [Aspirin]     Celebrex [Celecoxib]     Ciprofloxacin     Lisinopril     Nabumetone     Naproxen     Nsaids     Vancomycin     Vioxx [Rofecoxib]        MEDICATIONS    Current Outpatient Medications on File Prior to Encounter   Medication Sig Dispense Refill    clotrimazole-betamethasone Tunneling Position ___ O'Clock 0 2/12/2020  9:54 AM   Undermining Starts ___ O'Clock 0 2/12/2020  9:54 AM   Undermining Ends___ O'Clock 0 2/12/2020  9:54 AM   Undermining Maxium Distance (cm) 0 2/12/2020  9:54 AM   Wound Assessment Pink 2/12/2020  9:54 AM   Drainage Amount None 2/12/2020  9:54 AM   Drainage Description Clear 1/29/2020 10:43 AM   Odor None 2/12/2020  9:54 AM   Margins Defined edges 2/12/2020  9:54 AM   Vera-wound Assessment Pink 2/12/2020  9:54 AM   Non-staged Wound Description Not applicable 7/47/2358  0:57 AM   [place]%Wound Bed 100 2/12/2020  9:54 AM   Red%Wound Bed 0 2/12/2020  9:54 AM   Yellow%Wound Bed 0 2/12/2020  9:54 AM   Black%Wound Bed 0 2/12/2020  9:54 AM   Purple%Wound Bed 0 2/12/2020  9:54 AM   Other%Wound Bed 0 2/12/2020  9:54 AM   Number of days: 244       Wound 01/22/20 Leg Right;Lateral #6 rt lateral lower leg- lymphedema  (Active)   Wound Image   1/29/2020 10:43 AM   Offloading for Diabetic Foot Ulcers No 2/12/2020  9:54 AM   Dressing Status Clean;Dry; Intact 1/29/2020 10:50 AM   Dressing Changed Changed/New 1/29/2020 10:50 AM   Wound Cleansed Wound cleanser 2/12/2020  9:54 AM   Wound Length (cm) 0.5 cm 2/12/2020  9:54 AM   Wound Width (cm) 0.5 cm 2/12/2020  9:54 AM   Wound Depth (cm) 0.1 cm 2/12/2020  9:54 AM   Wound Surface Area (cm^2) 0.25 cm^2 2/12/2020  9:54 AM   Change in Wound Size % (l*w) 79.17 2/12/2020  9:54 AM   Wound Volume (cm^3) 0.02 cm^3 2/12/2020  9:54 AM   Wound Healing % 83 2/12/2020  9:54 AM   Distance Tunneling (cm) 0 cm 2/12/2020  9:54 AM   Tunneling Position ___ O'Clock 0 2/12/2020  9:54 AM   Undermining Starts ___ O'Clock 0 2/12/2020  9:54 AM   Undermining Ends___ O'Clock 0 2/12/2020  9:54 AM   Undermining Maxium Distance (cm) 0 2/12/2020  9:54 AM   Wound Assessment Yellow 2/12/2020  9:54 AM   Drainage Amount None 2/12/2020  9:54 AM   Drainage Description Serosanguinous 1/22/2020 10:30 AM   Odor None 2/12/2020  9:54 AM   Margins Defined edges 2/12/2020  9:54 AM

## 2020-03-11 ENCOUNTER — HOSPITAL ENCOUNTER (OUTPATIENT)
Dept: WOUND CARE | Age: 65
Discharge: HOME | End: 2020-03-11
Payer: MEDICARE

## 2020-03-11 VITALS
RESPIRATION RATE: 17 BRPM | HEART RATE: 57 BPM | DIASTOLIC BLOOD PRESSURE: 86 MMHG | TEMPERATURE: 97.3 F | SYSTOLIC BLOOD PRESSURE: 138 MMHG

## 2020-03-11 PROCEDURE — 99213 OFFICE O/P EST LOW 20 MIN: CPT

## 2020-03-11 PROCEDURE — 99213 OFFICE O/P EST LOW 20 MIN: CPT | Performed by: NURSE PRACTITIONER

## 2020-03-12 NOTE — PROGRESS NOTES
Wound Care Center Progress Note       Kathleen Cordero  AGE: 72 y.o. GENDER: female  : 1955  TODAY'S DATE:  3/11/2020        Subjective:     Chief Complaint   Patient presents with    Wound Check     bilateral lower extremity         HISTORY of PRESENT ILLNESS     Kathleen Cordero is a 72 y.o. female who presents today for wound evaluation of Chronic venous and lymphedema ulcer(s) of bilateral posterior thighs. She has slight redness to the right to the right lower leg. Lymphedema pumps have helped minimize cellulitis. The ulcer is of moderate severity. The underlying cause of the wound is venous and lymphedema.     Wound Pain Timing/Severity: none  Quality of pain: N/A  Severity of pain:  0 / 10   Modifying Factors: edema, venous stasis, lymphedema and obesity  Associated Signs/Symptoms: edema, erythema and drainage        PAST MEDICAL HISTORY        Diagnosis Date    Cellulitis     Diabetes mellitus ([address]     Gallbladder disease     Gallbladder sludge     Hx of blood clots     Lymphedema     Pulmonary embolism (HCC)     Renal failure     Saddle embolus     Thrombosis        PAST SURGICAL HISTORY    Past Surgical History:   Procedure Laterality Date    CARDIAC SURGERY      IVC filter placement     COSMETIC SURGERY      JOINT REPLACEMENT      Bilateral partial knee        FAMILY HISTORY    Family History   Problem Relation Age of Onset    Coronary Art Dis Father     Stroke Father     Coronary Art Dis Brother     Stroke Brother     Diabetes Brother     High Blood Pressure Brother        SOCIAL HISTORY    Social History     Tobacco Use    Smoking status: Never Smoker    Smokeless tobacco: Never Used   Substance Use Topics    Alcohol use: No    Drug use: No       ALLERGIES    Allergies   Allergen Reactions    Asa [Aspirin]     Celebrex [Celecoxib]     Ciprofloxacin     Lisinopril     Nabumetone     Naproxen     Nsaids     Vancomycin     Vioxx [Rofecoxib] MEDICATIONS    Current Outpatient Medications on File Prior to Encounter   Medication Sig Dispense Refill    clotrimazole-betamethasone (LOTRISONE) 1-0.05 % cream Apply topically 2 times daily. 45 g 2    clotrimazole-betamethasone (LOTRISONE) 1-0.05 % cream Apply topically 2 times daily. 45 g 2    losartan (COZAAR) 25 MG tablet Take 50 mg by mouth 2 times daily       levothyroxine (SYNTHROID) 50 MCG tablet Take 50 mcg by mouth Daily      atenolol (TENORMIN) 25 MG tablet Take 25 mg by mouth daily      glipiZIDE (GLUCOTROL) 5 MG tablet Take 5 mg by mouth 2 times daily (before meals)      warfarin (COUMADIN) 4 MG tablet Take 5 mg by mouth        No current facility-administered medications on file prior to encounter. REVIEW OF SYSTEMS    Pertinent items are noted in HPI. Constitutional: Negative for systemic symptoms including fever, chills and malaise. Objective:      /86   Pulse 57   Temp 97.3 °F (36.3 °C) (Temporal)   Resp 17     PHYSICAL EXAM    General: The patient is in no acute distress. Mental status:  Patient is appropriate, is  oriented to place and plan of care. Dermatologic exam: Visual inspection of the periwound reveals the skin to be coarse, sclerotic and edematous. Wound exam:  see wound description below     All active wounds listed below with today's date are evaluated      Wound 06/12/19 #2 Left Post. Lower Leg--lymphademia (Active)   Wound Image   2/12/2020 10:18 AM   Wound Other 3/11/2020 10:23 AM   Offloading for Diabetic Foot Ulcers No offloading required 3/11/2020 10:23 AM   Dressing Status Clean;Dry; Intact 3/11/2020 10:33 AM   Dressing Changed Changed/New 3/11/2020 10:33 AM   Wound Cleansed Rinsed/Irrigated with saline 3/11/2020 10:23 AM   Wound Length (cm) 0.5 cm 3/11/2020 10:23 AM   Wound Width (cm) 1 cm 3/11/2020 10:23 AM   Wound Depth (cm) 0.1 cm 3/11/2020 10:23 AM   Wound Surface Area (cm^2) 0.5 cm^2 3/11/2020 10:23 AM   Change in Wound Size % (l*w) Distance (cm) 0 3/11/2020 10:23 AM   Wound Assessment Yellow 3/11/2020 10:23 AM   Drainage Amount Moderate 3/11/2020 10:23 AM   Drainage Description Serosanguinous 3/11/2020 10:23 AM   Odor None 3/11/2020 10:23 AM   Margins Defined edges 3/11/2020 10:23 AM   Vera-wound Assessment Pink 3/11/2020 10:23 AM   Non-staged Wound Description Full thickness 3/11/2020 10:23 AM   [place]%Wound Bed 0 3/11/2020 10:23 AM   Red%Wound Bed 0 3/11/2020 10:23 AM   Yellow%Wound Bed 100 3/11/2020 10:23 AM   Black%Wound Bed 0 3/11/2020 10:23 AM   Purple%Wound Bed 0 3/11/2020 10:23 AM   Other%Wound Bed 0 3/11/2020 10:23 AM   Number of days: 49       Wound 03/11/20 #7 Right Posterior Thigh--pressure stage II (Active)   Dressing Status Clean;Dry; Intact 3/11/2020 10:33 AM   Dressing Changed Changed/New 3/11/2020 10:33 AM   Wound Cleansed Rinsed/Irrigated with saline 3/11/2020 10:23 AM   Wound Length (cm) 0.6 cm 3/11/2020 10:23 AM   Wound Width (cm) 2.3 cm 3/11/2020 10:23 AM   Wound Depth (cm) 0.1 cm 3/11/2020 10:23 AM   Wound Surface Area (cm^2) 1.38 cm^2 3/11/2020 10:23 AM   Wound Volume (cm^3) 0.14 cm^3 3/11/2020 10:23 AM   Distance Tunneling (cm) 0 cm 3/11/2020 10:23 AM   Tunneling Position ___ O'Clock 0 3/11/2020 10:23 AM   Undermining Starts ___ O'Clock 0 3/11/2020 10:23 AM   Undermining Ends___ O'Clock 0 3/11/2020 10:23 AM   Undermining Maxium Distance (cm) 0 3/11/2020 10:23 AM   Wound Assessment Red;Yellow 3/11/2020 10:23 AM   Drainage Amount Moderate 3/11/2020 10:23 AM   Drainage Description Serosanguinous 3/11/2020 10:23 AM   Odor None 3/11/2020 10:23 AM   Margins Defined edges 3/11/2020 10:23 AM   Vera-wound Assessment Pink 3/11/2020 10:23 AM   Non-staged Wound Description Full thickness 3/11/2020 10:23 AM   [place]%Wound Bed 0 3/11/2020 10:23 AM   Red%Wound Bed 50 3/11/2020 10:23 AM   Yellow%Wound Bed 50 3/11/2020 10:23 AM   Black%Wound Bed 0 3/11/2020 10:23 AM   Purple%Wound Bed 0 3/11/2020 10:23 AM   Other%Wound Bed 0 3/11/2020 10:23

## 2020-04-14 ENCOUNTER — TELEPHONE (OUTPATIENT)
Dept: WOUND CARE | Age: 65
End: 2020-04-14

## 2020-04-14 NOTE — TELEPHONE ENCOUNTER
Called to check on patient today due to patient not see in clinic due to [address]--Her sister has been caring for her and she states the wound on the right leg is very stable and just draining a little but the left leg has developed a new wound which she is dressing with a mepilex border per patient previous orders here at the clinic. RN instructed sister to watch area closely and call clinic if wound area doesn't improve but instead gets worse. Sister voiced understanding and states patient is using lymph pumps two times per day and no further needs at this time. RN educated about telehealth visit which can be used if patient decides she doesn't want to come into clinic. Rn will follow up with patient in two weeks to see if area has improved and potentially schedule a telehealth visit.  Electronically signed by Ratna Brand RN on 4/14/2020 at 1:59 PM

## 2020-05-15 ENCOUNTER — HOSPITAL ENCOUNTER (OUTPATIENT)
Dept: WOUND CARE | Age: 65
Discharge: HOME | End: 2020-05-15
Payer: MEDICARE

## 2020-05-15 PROCEDURE — G2062 QUAL NONMD EST PT 11-20M: HCPCS | Performed by: NURSE PRACTITIONER

## 2020-05-15 PROCEDURE — 99212 OFFICE O/P EST SF 10 MIN: CPT

## 2020-05-15 RX ORDER — INSULIN GLARGINE 100 [IU]/ML
25 INJECTION, SOLUTION SUBCUTANEOUS 2 TIMES DAILY
COMMUNITY

## 2020-05-15 RX ORDER — SULFAMETHOXAZOLE AND TRIMETHOPRIM 800; 160 MG/1; MG/1
1 TABLET ORAL 2 TIMES DAILY
Qty: 20 TABLET | Refills: 0 | Status: SHIPPED | OUTPATIENT
Start: 2020-05-15 | End: 2020-05-25

## 2020-05-15 NOTE — PROGRESS NOTES
Virtual Visit Wound Care  Clinic Level of Care   NAME:  Guy Biggs  YOB: 1955 GENDER: female  MEDICAL RECORD NUMBER:  [id number]   DATE:  5/15/2020     Patient Type Points   No documentation completed by nursing staff. []   0   Nursing staff documented in the navigator for an ESTABLISHED patient including Episode, Patient ID, Chief Complaint, Travel Screen, Allergies, Latex Allergy, Home Medication, History, Psychosocial Screen, C-SSRS Screen, Fall Risk, Nutritional Screen, Advanced Directive, Education and Plan of Care, and Discharge Instructions. The Functional Screening tab is only required if the patient's status changes. [x]   1   Nursing staff documented in the navigator for a NEW patient including Patient ID, Chief Complaint, Travel Screen, Allergies, Latex Allergy, Home Medication, History, Psychosocial Screen, C-SSRS Screen, Fall Risk, Nutritional Screen, Advanced Directive, Functional Screen, Education and Plan of Care, and Discharge Instructions. []   2   Nursing staff documented in the navigator for a CONSULT patient including Episode, Patient ID, Chief Complaint, Travel Screen, Allergies, Latex Allergy, Home Medication, History, Psychosocial Screen, C-SSRS Screen, Fall Risk, Nutritional Screen, Advanced Directive, Functional Screen, Education and Plan of Care, and Discharge Instructions. []   2     Wound Description Points   Unable to obtain image of Wound. For example, patient/caregiver is instructed not to remove dressing, is unable to correctly position smart phone, no smart phone is available, patient is unable to maintain connectivity or the patient's wound is healed. []   0   1-3 wound images annotated. Images of the wound(s) is obtained and annotated along with completed description in [address] [place] [address]. [x]   1   4-5 wound images annotated. Images of the wound(s) is obtained and annotated along with completed description in [address] [place] [address]. []   2   Greater than 6 wound images annotated. Images of the wound(s) is obtained and annotated along with completed description in [address] [place] [address]. []   3     Education Points   No Education completed by nursing staff.    []   0   Patient/caregiver is educated on 1-4 topics. Nursing staff identifies learner, confirms understanding of information (verbal, demonstration, written) and documents details. May include Discharge Instructions/AVS, available documents in My Chart, or Web-based learning.  []   1   Patient/caregiver is educated on 5-9 topics. Nursing staff identifies learner, confirms understanding of information (verbal, demonstration, written) and documents details. May include Discharge Instructions/AVS, available documents in My Chart, or Web-based learning. [x]   2   Patient/caregiver is educated on 10 or more topics. Nursing staff identifies learner, confirms understanding of information (verbal, demonstration, written) and documents details. May include Discharge Instructions/AVS, available documents in My Chart, or Web-based learning. []   3     Follow-up Virtual Visit Points   No contact with outside resources made. []   0   Nursing staff contacts 1-2 outside resource. For example, telephone call made to home health, primary care provider, pharmacy, or DME. May include filling out forms and writing letters, arranging transportation, communication with insurance , vendors, etc.  Discharge, instructions and/or After Visit Summary given to patient/caregiver and instructions completed. [x]   1   Nursing staff contacts 3-4 outside resource. For example, telephone calls made to home health, primary care provider, pharmacy, or DME. May include filling out forms and writing letters, arranging transportation, communication with insurance , vendors, etc.  Discharge, instructions and/or After Visit Summary given to patient/caregiver and instructions completed. []   2   Nursing contacts 5 or more outside resource.  For example,

## 2020-05-15 NOTE — PROGRESS NOTES
daily  Bactroban topically daily to right lower leg  Patient was instructed to call her PCP to instruct she was placed on Bactrim and her coumadin PT/INR will need to be checked more frequently. She states her last INR was done within the last week or two and was 2.2. Patient Indicates understanding    Written patient dismissal instructions available to Patient/POA       Discharge Instructions       PHYSICIAN ORDERS AND DISCHARGE INSTRUCTIONS     NOTE: Upon discharge from the [address], you will receive a patient experience survey. We would be grateful if you would take the time to fill this survey out.     Wound care order history:                 CARROL's   Right       Left               Date not able to obtain              Vascular studies:   Date               Imaging:   Date               Cultures:   Date               Labs/ HbA1c:   Date               Grafts:  Date               HBO:  To be determined              Antibiotics:  AUGMENTIN BID FOR 10 DAYS ON 7/24/19              Earlier Wound care treatments:                Authorizations:                        Consults:   Date                           PCP: Johana Arevalo     Continuing wound care orders and information:              Residence: home              Continue home health care with:               Your wound-care supplies will be provided by: Berto Anders provider:              [id number] with               Off loading:  Date               TLIOQ Meds:               TWWOS cleansing:                           TP not scrub or use excessive force.                          Wash hands with soap and water before and after dressing changes.                           Prior to applying a clean dressing, cleanse wound with normal saline,                          wound cleanser, or mild soap and water.                           Ask your physician or nurse before getting the wound(s) wet in the shower.              Daily Wound

## 2020-05-29 ENCOUNTER — HOSPITAL ENCOUNTER (OUTPATIENT)
Dept: WOUND CARE | Age: 65
Discharge: HOME | End: 2020-05-29
Payer: MEDICARE

## 2020-05-29 PROCEDURE — G2062 QUAL NONMD EST PT 11-20M: HCPCS | Performed by: NURSE PRACTITIONER

## 2020-05-29 PROCEDURE — 99212 OFFICE O/P EST SF 10 MIN: CPT

## 2020-05-29 NOTE — PROGRESS NOTES
Celebrex [Celecoxib]     Ciprofloxacin     Lisinopril     Nabumetone     Naproxen     Nsaids     Vancomycin     Vioxx [Rofecoxib]        MEDICATIONS    Current Outpatient Medications on File Prior to Encounter   Medication Sig Dispense Refill    insulin glargine (BASAGLAR KWIKPEN) 100 UNIT/ML injection pen Inject 25 Units into the skin 2 times daily 25 Units in the AM and 20 Units in the PM      clotrimazole-betamethasone (LOTRISONE) 1-0.05 % cream Apply topically 2 times daily. 45 g 2    clotrimazole-betamethasone (LOTRISONE) 1-0.05 % cream Apply topically 2 times daily. 45 g 2    losartan (COZAAR) 25 MG tablet Take 50 mg by mouth 2 times daily       levothyroxine (SYNTHROID) 50 MCG tablet Take 50 mcg by mouth Daily      atenolol (TENORMIN) 25 MG tablet Take 25 mg by mouth daily      glipiZIDE (GLUCOTROL) 5 MG tablet Take 5 mg by mouth 2 times daily (before meals)      warfarin (COUMADIN) 4 MG tablet Take 5 mg by mouth        No current facility-administered medications on file prior to encounter. REVIEW OF SYSTEMS    Pertinent items are noted in HPI.     Objective:     PHYSICAL EXAM    General Appearance: alert and oriented to person, place and time, well-developed and well-nourished, in no acute distress    Assessment:      Problem List Items Addressed This Visit     WD - Lymphedema of both lower extremities - Primary    WD - Chronic ulcer of left thigh with fat layer exposed ([address]    WD - Chronic ulcer of right thigh with fat layer exposed ([address]    Morbid obesity ([address]    Chronic venous hypertension (idiopathic) with ulcer of left lower extremity ([address]    Chronic venous hypertension (idiopathic) with inflammation of right lower extremity    WD-Chronic venous insufficiency          Performed by: MIA Garcia - CNP    Wound 01/22/20 Leg Right;Lateral #6 rt lateral lower leg- lymphedema  (Active)   Wound Image   5/29/2020  9:02 AM   Wound Venous 5/29/2020  9:02 AM   Offloading for Diabetic Foot Ulcers No offloading required 3/11/2020 10:23 AM   Dressing Status Clean;Dry; Intact 3/11/2020 10:33 AM   Dressing Changed Changed/New 3/11/2020 10:33 AM   Wound Cleansed Rinsed/Irrigated with saline 3/11/2020 10:23 AM   Wound Length (cm) 0.7 cm 3/11/2020 10:23 AM   Wound Width (cm) 0.4 cm 3/11/2020 10:23 AM   Wound Depth (cm) 0.1 cm 3/11/2020 10:23 AM   Wound Surface Area (cm^2) 0.28 cm^2 3/11/2020 10:23 AM   Change in Wound Size % (l*w) 76.67 3/11/2020 10:23 AM   Wound Volume (cm^3) 0.03 cm^3 3/11/2020 10:23 AM   Wound Healing % 75 3/11/2020 10:23 AM   Distance Tunneling (cm) 0 cm 3/11/2020 10:23 AM   Tunneling Position ___ O'Clock 0 3/11/2020 10:23 AM   Undermining Starts ___ O'Clock 0 3/11/2020 10:23 AM   Undermining Ends___ O'Clock 0 3/11/2020 10:23 AM   Undermining [place] Distance (cm) 0 3/11/2020 10:23 AM   Wound Assessment Yellow 5/29/2020  9:02 AM   Drainage Amount Moderate 5/29/2020  9:02 AM   Drainage Description Serosanguinous 5/29/2020  9:02 AM   Odor None 5/29/2020  9:02 AM   Margins Defined edges 5/29/2020  9:02 AM   Vera-wound Assessment Red 5/29/2020  9:02 AM   Non-staged Wound Description Full thickness 5/29/2020  9:02 AM   [place]%Wound Bed 0 5/29/2020  9:02 AM   Red%Wound Bed 0 5/29/2020  9:02 AM   Yellow%Wound Bed 100 5/29/2020  9:02 AM   Black%Wound Bed 0 5/29/2020  9:02 AM   Purple%Wound Bed 0 5/29/2020  9:02 AM   Other%Wound Bed 0 5/29/2020  9:02 AM   Number of days: 127          Diabetic/Pressure/Non Pressure Ulcers only:  Ulcer: N/A       Plan:     Please see attached Discharge Instructions    Based upon this virtual visit it is not required to have an in-person visit of this time. Bactrim 800-160 mg one tablet twice daily x 10 days almost complete, patient denies side effects with use. Bactroban topically daily to right lower leg  Patient was instructed to call her PCP to instruct she was placed on Bactrim and her coumadin PT/INR will need to be checked more frequently.  She

## 2020-05-29 NOTE — PROGRESS NOTES
Virtual Visit Wound Care  Clinic Level of Care   NAME:  Ivone Snider  YOB: 1955 GENDER: female  MEDICAL RECORD NUMBER:  [id number]   DATE:  5/29/2020     Patient Type Points   No documentation completed by nursing staff. []   0   Nursing staff documented in the navigator for an ESTABLISHED patient including Episode, Patient ID, Chief Complaint, Travel Screen, Allergies, Latex Allergy, Home Medication, History, Psychosocial Screen, C-SSRS Screen, Fall Risk, Nutritional Screen, Advanced Directive, Education and Plan of Care, and Discharge Instructions. The Functional Screening tab is only required if the patient's status changes. [x]   1   Nursing staff documented in the navigator for a NEW patient including Patient ID, Chief Complaint, Travel Screen, Allergies, Latex Allergy, Home Medication, History, Psychosocial Screen, C-SSRS Screen, Fall Risk, Nutritional Screen, Advanced Directive, Functional Screen, Education and Plan of Care, and Discharge Instructions. []   2   Nursing staff documented in the navigator for a CONSULT patient including Episode, Patient ID, Chief Complaint, Travel Screen, Allergies, Latex Allergy, Home Medication, History, Psychosocial Screen, C-SSRS Screen, Fall Risk, Nutritional Screen, Advanced Directive, Functional Screen, Education and Plan of Care, and Discharge Instructions. []   2     Wound Description Points   Unable to obtain image of Wound. For example, patient/caregiver is instructed not to remove dressing, is unable to correctly position smart phone, no smart phone is available, patient is unable to maintain connectivity or the patient's wound is healed. []   0   1-3 wound images annotated. Images of the wound(s) is obtained and annotated along with completed description in [address] [place] [address]. [x]   1   4-5 wound images annotated. Images of the wound(s) is obtained and annotated along with completed description in [address] [place] [address]. []   2   Greater than 6 wound images annotated. Images of the wound(s) is obtained and annotated along with completed description in [address] [place] [address]. []   3     Education Points   No Education completed by nursing staff.    []   0   Patient/caregiver is educated on 1-4 topics. Nursing staff identifies learner, confirms understanding of information (verbal, demonstration, written) and documents details. May include Discharge Instructions/AVS, available documents in My Chart, or Web-based learning.  []   1   Patient/caregiver is educated on 5-9 topics. Nursing staff identifies learner, confirms understanding of information (verbal, demonstration, written) and documents details. May include Discharge Instructions/AVS, available documents in My Chart, or Web-based learning. [x]   2   Patient/caregiver is educated on 10 or more topics. Nursing staff identifies learner, confirms understanding of information (verbal, demonstration, written) and documents details. May include Discharge Instructions/AVS, available documents in My Chart, or Web-based learning. []   3     Follow-up Virtual Visit Points   No contact with outside resources made. []   0   Nursing staff contacts 1-2 outside resource. For example, telephone call made to home health, primary care provider, pharmacy, or DME. May include filling out forms and writing letters, arranging transportation, communication with insurance , vendors, etc.  Discharge, instructions and/or After Visit Summary given to patient/caregiver and instructions completed. [x]   1   Nursing staff contacts 3-4 outside resource. For example, telephone calls made to home health, primary care provider, pharmacy, or DME. May include filling out forms and writing letters, arranging transportation, communication with insurance , vendors, etc.  Discharge, instructions and/or After Visit Summary given to patient/caregiver and instructions completed. []   2   Nursing contacts 5 or more outside resource.  For example, telephone calls made to home health, primary care providers, pharmacy, or DME. May include filling out forms and writing letters, arranging transportation, communication with insurance , vendors, etc.  Discharge, instructions and/or After Visit Summary given to patient/caregiver and instructions completed.    []   3     Is this the Patient's First Visit to the [address] [place] [address] Road  No    Is this Patient Established @ [facility]  Yes             Virtual Visit Clinical Level of Care Wound Care      Points  1-3  Level 1 []     Points  4-5  Level 2 [x]     Points  6-7  Level 3 []     Points  8-9  Level 4 []     Points  10-11  Level 5 []       Electronically signed by Carlyle Marina RN on 5/29/2020 at @NO

## 2020-06-10 ENCOUNTER — HOSPITAL ENCOUNTER (OUTPATIENT)
Dept: WOUND CARE | Age: 65
Discharge: HOME | End: 2020-06-10
Payer: MEDICARE

## 2020-06-10 PROCEDURE — G2062 QUAL NONMD EST PT 11-20M: HCPCS | Performed by: NURSE PRACTITIONER

## 2020-06-10 PROCEDURE — 99211 OFF/OP EST MAY X REQ PHY/QHP: CPT

## 2020-06-10 NOTE — PROGRESS NOTES
Virtual Visit Wound Care  Clinic Level of Care   NAME:  Malaika Cunha  YOB: 1955 GENDER: female  MEDICAL RECORD NUMBER:  [id number]   DATE:  6/10/2020      Patient Type Points   No documentation completed by nursing staff. []?   0   Nursing staff documented in the navigator for an ESTABLISHED patient including Episode, Patient ID, Chief Complaint, Travel Screen, Allergies, Latex Allergy, Home Medication, History, Psychosocial Screen, C-SSRS Screen, Fall Risk, Nutritional Screen, Advanced Directive, Education and Plan of Care, and Discharge Instructions. The Functional Screening tab is only required if the patient's status changes. [x]? 1   Nursing staff documented in the navigator for a NEW patient including Patient ID, Chief Complaint, Travel Screen, Allergies, Latex Allergy, Home Medication, History, Psychosocial Screen, C-SSRS Screen, Fall Risk, Nutritional Screen, Advanced Directive, Functional Screen, Education and Plan of Care, and Discharge Instructions. []?   2   Nursing staff documented in the navigator for a CONSULT patient including Episode, Patient ID, Chief Complaint, Travel Screen, Allergies, Latex Allergy, Home Medication, History, Psychosocial Screen, C-SSRS Screen, Fall Risk, Nutritional Screen, Advanced Directive, Functional Screen, Education and Plan of Care, and Discharge Instructions. []?   2      Wound Description Points   Unable to obtain image of Wound. For example, patient/caregiver is instructed not to remove dressing, is unable to correctly position smart phone, no smart phone is available, patient is unable to maintain connectivity or the patient's wound is healed. [x]?   0   1-3 wound images annotated. Images of the wound(s) is obtained and annotated along with completed description in [address] [place] [address]. []?   1   4-5 wound images annotated. Images of the wound(s) is obtained and annotated along with completed description in [address] [place] [address].  []?   2   Greater than 6 wound images annotated. Images of the wound(s) is obtained and annotated along with completed description in [address] [place] [address]. []?   3      Education Points   No Education completed by nursing staff.     []? 0   Patient/caregiver is educated on 1-4 topics. Nursing staff identifies learner, confirms understanding of information (verbal, demonstration, written) and documents details. May include Discharge Instructions/AVS, available documents in My Chart, or Web-based learning. []?   1   Patient/caregiver is educated on 5-9 topics. Nursing staff identifies learner, confirms understanding of information (verbal, demonstration, written) and documents details. May include Discharge Instructions/AVS, available documents in My Chart, or Web-based learning. [x]? 2   Patient/caregiver is educated on 10 or more topics. Nursing staff identifies learner, confirms understanding of information (verbal, demonstration, written) and documents details. May include Discharge Instructions/AVS, available documents in My Chart, or Web-based learning. []?   3      Follow-up Virtual Visit Points   No contact with outside resources made.     []? 0   Nursing staff contacts 1-2 outside resource. For example, telephone call made to home health, primary care provider, pharmacy, or DME. May include filling out forms and writing letters, arranging transportation, communication with insurance , vendors, etc.  Discharge, instructions and/or After Visit Summary given to patient/caregiver and instructions completed. [x]? 1   Nursing staff contacts 3-4 outside resource. For example, telephone calls made to home health, primary care provider, pharmacy, or DME. May include filling out forms and writing letters, arranging transportation, communication with insurance , vendors, etc.  Discharge, instructions and/or After Visit Summary given to patient/caregiver and instructions completed.    []?   2   Nursing contacts 5 or more outside

## 2020-09-02 ENCOUNTER — TELEPHONE (OUTPATIENT)
Dept: WOUND CARE | Age: 65
End: 2020-09-02

## 2021-01-26 ENCOUNTER — HOSPITAL ENCOUNTER (OUTPATIENT)
Dept: WOUND CARE | Age: 66
Discharge: HOME | End: 2021-01-26
Payer: MEDICARE

## 2021-01-26 VITALS
TEMPERATURE: 97.6 F | SYSTOLIC BLOOD PRESSURE: 149 MMHG | HEART RATE: 71 BPM | DIASTOLIC BLOOD PRESSURE: 101 MMHG | RESPIRATION RATE: 20 BRPM

## 2021-01-26 DIAGNOSIS — I89.0 LYMPHEDEMA OF BOTH LOWER EXTREMITIES: Primary | ICD-10-CM

## 2021-01-26 DIAGNOSIS — E66.01 MORBID OBESITY (HCC): ICD-10-CM

## 2021-01-26 PROCEDURE — 99214 OFFICE O/P EST MOD 30 MIN: CPT

## 2021-01-26 NOTE — PROGRESS NOTES
Wound Care Center Progress Note       Jenny Lopez  AGE: 72 y.o. GENDER: female  : 1955  TODAY'S DATE:  2021        Subjective:     Chief Complaint   Patient presents with    Wound Check     Bilateral lower legs         HISTORY of PRESENT ILLNESS     Jenny Lopez is a 72 y.o. female who presents today for wound evaluation of Acute on chronic lymphedema ulcer(s) of bilateral lower legs and thighs. The ulcer is of moderate severity. The underlying cause of the wound is lymphedema. Has been treated in the past, hard for her to use lymphedema pumps.    Wound Pain Timing/Severity: mild  Quality of pain: dull  Severity of pain:  2 / 10   Modifying Factors: lymphedema, diabetes and obesity  Associated Signs/Symptoms: edema, erythema, drainage and odor        PAST MEDICAL HISTORY        Diagnosis Date    Cellulitis     Diabetes mellitus ([address]     Gallbladder disease     Gallbladder sludge     Hx of blood clots     Lymphedema     Pulmonary embolism (HCC)     Renal failure     Saddle embolus     Thrombosis        PAST SURGICAL HISTORY    Past Surgical History:   Procedure Laterality Date    CARDIAC SURGERY      IVC filter placement     COSMETIC SURGERY      JOINT REPLACEMENT      Bilateral partial knee        FAMILY HISTORY    Family History   Problem Relation Age of Onset    Coronary Art Dis Father     Stroke Father     Coronary Art Dis Brother     Stroke Brother     Diabetes Brother     High Blood Pressure Brother        SOCIAL HISTORY    Social History     Tobacco Use    Smoking status: Never Smoker    Smokeless tobacco: Never Used   Substance Use Topics    Alcohol use: No    Drug use: No       ALLERGIES    Allergies   Allergen Reactions    Asa [Aspirin]     Celebrex [Celecoxib]     Ciprofloxacin     Lisinopril     Nabumetone     Naproxen     Nsaids     Vancomycin     Vioxx [Rofecoxib]        MEDICATIONS    Current Outpatient Medications on File Prior to Encounter Medication Sig Dispense Refill    DOXYCYCLINE CALCIUM PO Take by mouth      insulin glargine (BASAGLAR KWIKPEN) 100 UNIT/ML injection pen Inject 25 Units into the skin 2 times daily 25 Units in the AM and 20 Units in the PM      clotrimazole-betamethasone (LOTRISONE) 1-0.05 % cream Apply topically 2 times daily. 45 g 2    clotrimazole-betamethasone (LOTRISONE) 1-0.05 % cream Apply topically 2 times daily. 45 g 2    losartan (COZAAR) 25 MG tablet Take 50 mg by mouth 2 times daily       levothyroxine (SYNTHROID) 50 MCG tablet Take 50 mcg by mouth Daily      atenolol (TENORMIN) 25 MG tablet Take 25 mg by mouth daily      glipiZIDE (GLUCOTROL) 5 MG tablet Take 5 mg by mouth 2 times daily (before meals)      warfarin (COUMADIN) 4 MG tablet Take 5 mg by mouth        No current facility-administered medications on file prior to encounter. REVIEW OF SYSTEMS    Pertinent items are noted in HPI. Constitutional: Negative for systemic symptoms including fever, chills and malaise. Objective:      BP (!) 149/101   Pulse 71   Temp 97.6 °F (36.4 °C)   Resp 20     PHYSICAL EXAM      General: The patient is in no acute distress. Mental status:  Patient is appropriate, is  oriented to place and plan of care. Dermatologic exam: Visual inspection of the periwound reveals the skin to be coarse, scaly and edematous. Wound exam:  see wound description below     All active wounds listed below with today's date are evaluated      Wound 01/26/21 Wound #1 Left Medial Lower Leg (Onset x 1 Week) (Active)   Wound Image   01/26/21 1051   Wound Etiology Other 01/26/21 1051   Dressing Status Clean;Dry; Intact; New dressing applied 01/26/21 1203   Wound Cleansed Cleansed with saline 01/26/21 1051   Dressing/Treatment Alginate 01/26/21 1203   Offloading for Diabetic Foot Ulcers No offloading required 01/26/21 1051   Wound Length (cm) 19.5 cm 01/26/21 1051   Wound Width (cm) 16 cm 01/26/21 1051   Wound Depth (cm) 0.1 cm 01/26/21 1051   Wound Surface Area (cm^2) 312 cm^2 01/26/21 1051   Wound Volume (cm^3) 31.2 cm^3 01/26/21 1051   Distance Tunneling (cm) 0 cm 01/26/21 1108   Tunneling Position ___ O'Clock 0 01/26/21 1108   Undermining Starts ___ O'Clock 0 01/26/21 1108   Undermining Ends___ O'Clock 0 01/26/21 1108   Undermining Maxium Distance (cm) 0 01/26/21 1108   Wound Assessment Granulation tissue 01/26/21 1108   Drainage Amount Small 01/26/21 1108   Drainage Description Serosanguinous 01/26/21 1108   Odor None 01/26/21 1108   Vera-wound Assessment Dry/flaky 01/26/21 1108   Margins Defined edges 01/26/21 1108   Wound Thickness Description not for Pressure Injury Full thickness 01/26/21 1108   Number of days: 0       Wound 01/26/21 Wound #2 Right Cicumf. Lower Leg (Active)   Wound Image   01/26/21 1108   Dressing Status Clean;Dry; Intact; New dressing applied 01/26/21 1203   Wound Cleansed Cleansed with saline 01/26/21 1108   Dressing/Treatment Alginate 01/26/21 1203   Offloading for Diabetic Foot Ulcers No offloading required 01/26/21 1108   Wound Length (cm) 72 cm 01/26/21 1108   Wound Width (cm) 40 cm 01/26/21 1108   Wound Depth (cm) 0.1 cm 01/26/21 1108   Wound Surface Area (cm^2) 2880 cm^2 01/26/21 1108   Wound Volume (cm^3) 288 cm^3 01/26/21 1108   Distance Tunneling (cm) 0 cm 01/26/21 1108   Tunneling Position ___ O'Clock 0 01/26/21 1108   Undermining Starts ___ O'Clock 0 01/26/21 1108   Undermining Ends___ O'Clock 0 01/26/21 1108   Undermining Maxium Distance (cm) 0 01/26/21 1108   Wound Assessment Pink/red 01/26/21 1108   Drainage Amount Small 01/26/21 1108   Drainage Description Serosanguinous 01/26/21 1108   Odor None 01/26/21 1108   Vera-wound Assessment Dry/flaky 01/26/21 1108   Margins Defined edges 01/26/21 1108   Wound Thickness Description not for Pressure Injury Full thickness 01/26/21 1108   Number of days: 0       Wound 01/26/21 Wound #3 Left Post, Thigh (Active)   Dressing Status Clean;Dry; Intact; New dressing applied 01/26/21 1203   Dressing/Treatment Alginate 01/26/21 1203   Number of days: 0       Assessment:       Problem List Items Addressed This Visit     WD - Lymphedema of both lower extremities - Primary    Morbid obesity ([address]          Status of wound progress and description from last visit:   Has a flare up. Severe lymphedema, will be challenging to treat. Plan:     Discharge Instructions       PHYSICIAN ORDERS AND DISCHARGE INSTRUCTIONS     NOTE: Upon discharge from the [address], you will receive a patient experience survey. We would be grateful if you would take the time to fill this survey out.     Wound care order history:                 CARROL's   Right       Left               Date n              Vascular studies:   Date               Imaging:   Date               Cultures:   Date               Antibiotics:                HbA1c:   Date               Grafts:  Date                              Continuing wound care orders and information:              Residence: home              Continue home health care with:               Your wound-care supplies will be provided by: [facility] provider:              Compression with               Off loading:  Date               XAIUX Meds:               UXBCM cleansing:                           DL not scrub or use excessive force.                          Wash hands with soap and water before and after dressing changes.                           Prior to applying a clean dressing, cleanse wound with normal saline,                          wound cleanser, or mild soap and water.                           Ask your physician or nurse before getting the wound(s) wet in the shower.              Daily Wound management:                          Keep weight off wounds and reposition every 2 hours.                          GUDJW standing for long periods of time.                          DKVCJ wraps/stockings in AM and remove at bedtime.                          Elevate legs to the level of the heart or above for 30 minutes 4-5 times a day and/or when sitting.                                               When taking antibiotics take entire prescription as ordered by MD do not stop taking until medicine is all gone.                                                                 Orders for this week: 01/26/21     Right leg and Left leg--Wash with mild soap and water. Apply lotrisone to intact skin (at home can use silvadene cream at home)   Cover with calcium alginate, and   Insert inner dry to skin folds. Change daily.      Rx: [address]:  silvadene cream called in to pharmacy and take as directed. Continue taking Doxycycline given by anastacia. [facility]; [facility] [address] to change dressing Daily    Follow up at the [address] [place] [address] 2 weeks  Call [phone] for any questions or concerns. Date__________   Time____________        Treatment Note Wound 01/26/21 Wound #1 Left Medial Lower Leg (Onset x 1 Week)-Dressing/Treatment: Alginate(lotrisone, calcium alginate, inter-dry,)  Wound 01/26/21 Wound #2 Right Cicumf.  Lower Leg-Dressing/Treatment: Alginate(lotrisone, calcium alginate, inter-dry,)  Wound 01/26/21 Wound #3 Left Post, Thigh-Dressing/Treatment: Alginate(lotrisone, calcium alginate, inter-dry,)    Written Patient Dismissal Instructions Given            Electronically signed by Reynold Barron MD on 1/26/2021 at 2:25 PM

## 2021-02-09 ENCOUNTER — HOSPITAL ENCOUNTER (OUTPATIENT)
Dept: WOUND CARE | Age: 66
Discharge: HOME | End: 2021-02-09

## 2021-02-16 ENCOUNTER — HOSPITAL ENCOUNTER (OUTPATIENT)
Dept: WOUND CARE | Age: 66
Discharge: HOME | End: 2021-02-16

## 2021-02-23 ENCOUNTER — HOSPITAL ENCOUNTER (OUTPATIENT)
Dept: WOUND CARE | Age: 66
Discharge: HOME | End: 2021-02-23

## 2021-03-02 ENCOUNTER — HOSPITAL ENCOUNTER (OUTPATIENT)
Dept: WOUND CARE | Age: 66
Discharge: HOME | End: 2021-03-02
Payer: MEDICARE

## 2021-03-02 VITALS
RESPIRATION RATE: 20 BRPM | DIASTOLIC BLOOD PRESSURE: 79 MMHG | TEMPERATURE: 97.1 F | HEART RATE: 95 BPM | SYSTOLIC BLOOD PRESSURE: 150 MMHG

## 2021-03-02 PROCEDURE — 99214 OFFICE O/P EST MOD 30 MIN: CPT

## 2021-03-02 RX ORDER — NYSTATIN 100000 [USP'U]/G
POWDER TOPICAL
Qty: 2 BOTTLE | Refills: 1 | Status: SHIPPED | OUTPATIENT
Start: 2021-03-02

## 2021-03-02 NOTE — PROGRESS NOTES
Wound Care Center Progress Note       Jackie Spencer  AGE: 77 y.o. GENDER: female  : 1955  TODAY'S DATE:  3/2/2021        Subjective:     Chief Complaint   Patient presents with    Wound Check     BLE         HISTORY of PRESENT ILLNESS     Jackie Spencer is a 77 y.o. female who presents today for wound evaluation of Acute on chronic lymphedema ulcer(s) of bilateral LE's. The ulcer is of mild severity. The underlying cause of the wound is severe lymphedema. RLE has improved, still areas of weeping lymphedema on the LLE.    Wound Pain Timing/Severity: mild  Quality of pain: aching  Severity of pain:  2 / 10   Modifying Factors: lymphedema, diabetes and obesity  Associated Signs/Symptoms: edema, erythema, drainage and odor        PAST MEDICAL HISTORY        Diagnosis Date    Cellulitis     Diabetes mellitus ([address]     Gallbladder disease     Gallbladder sludge     Hx of blood clots     Lymphedema     Pulmonary embolism (HCC)     Renal failure     Saddle embolus     Thrombosis        PAST SURGICAL HISTORY    Past Surgical History:   Procedure Laterality Date    CARDIAC SURGERY      IVC filter placement     COSMETIC SURGERY      JOINT REPLACEMENT      Bilateral partial knee        FAMILY HISTORY    Family History   Problem Relation Age of Onset    Coronary Art Dis Father     Stroke Father     Coronary Art Dis Brother     Stroke Brother     Diabetes Brother     High Blood Pressure Brother        SOCIAL HISTORY    Social History     Tobacco Use    Smoking status: Never Smoker    Smokeless tobacco: Never Used   Substance Use Topics    Alcohol use: No    Drug use: No       ALLERGIES    Allergies   Allergen Reactions    Asa [Aspirin]     Celebrex [Celecoxib]     Ciprofloxacin     Lisinopril     Nabumetone     Naproxen     Nsaids     Vancomycin     Vioxx [Rofecoxib]        MEDICATIONS    Current Outpatient Medications on File Prior to Encounter   Medication Sig Dispense Refill    1029   Wound Surface Area (cm^2) 414 cm^2 03/02/21 1029   Change in Wound Size % (l*w) -32.69 03/02/21 1029   Wound Volume (cm^3) 41.4 cm^3 03/02/21 1029   Wound Healing % -33 03/02/21 1029   Distance Tunneling (cm) 0 cm 03/02/21 1029   Tunneling Position ___ O'Clock 0 03/02/21 1029   Undermining Starts ___ O'Clock 0 03/02/21 1029   Undermining Ends___ O'Clock 0 03/02/21 1029   Undermining Maxium Distance (cm) 0 03/02/21 1029   Wound Assessment Pink/red 03/02/21 1029   Drainage Amount Moderate 03/02/21 1029   Drainage Description Yellow 03/02/21 1029   Odor None 03/02/21 1029   Vera-wound Assessment Dry/flaky 03/02/21 1029   Margins Defined edges; Unattached edges 03/02/21 1029   Wound Thickness Description not for Pressure Injury Full thickness 03/02/21 1029   Number of days: 35       Wound 01/26/21 Wound #2 Right Cicumf. Lower Leg (Active)   Wound Image   01/26/21 1108   Dressing Status Clean;Dry; Intact; New dressing applied 01/26/21 1203   Wound Cleansed Cleansed with saline 01/26/21 1108   Dressing/Treatment Alginate 01/26/21 1203   Offloading for Diabetic Foot Ulcers No offloading required 01/26/21 1108   Wound Length (cm) 34 cm 03/02/21 1029   Wound Width (cm) 74 cm 03/02/21 1029   Wound Depth (cm) 0.1 cm 03/02/21 1029   Wound Surface Area (cm^2) 2516 cm^2 03/02/21 1029   Change in Wound Size % (l*w) 12.64 03/02/21 1029   Wound Volume (cm^3) 251.6 cm^3 03/02/21 1029   Wound Healing % 13 03/02/21 1029   Distance Tunneling (cm) 0 cm 03/02/21 1029   Tunneling Position ___ O'Clock 0 03/02/21 1029   Undermining Starts ___ O'Clock 0 03/02/21 1029   Undermining Ends___ O'Clock 0 03/02/21 1029   Undermining Maxium Distance (cm) 0 03/02/21 1029   Wound Assessment Pink/red 03/02/21 1029   Drainage Amount Moderate 03/02/21 1029   Drainage Description Serosanguinous 03/02/21 1029   Odor None 03/02/21 1029   Vera-wound Assessment Dry/flaky 03/02/21 1029   Margins Defined edges; Unattached edges 03/02/21 1029   Wound Thickness Description not for Pressure Injury Full thickness 03/02/21 1029   Number of days: 35       Wound 01/26/21 Wound #3 Left Post, Thigh (Active)   Dressing Status Clean;Dry; Intact; New dressing applied 01/26/21 1203   Wound Cleansed Soap and water 03/02/21 1029   Dressing/Treatment Alginate 01/26/21 1203   Number of days: 35       Assessment:       Problem List Items Addressed This Visit     None          Status of wound progress and description from last visit:   Overall, improved. Plan:     Discharge Instructions            PHYSICIAN ORDERS AND DISCHARGE INSTRUCTIONS     NOTE: Upon discharge from the [address], you will receive a patient experience survey. We would be grateful if you would take the time to fill this survey out.     Wound care order history:                 CARROL's   Right       Left               Date n              Vascular studies:   Date               Imaging:   Date               Cultures:   Date               Antibiotics:                HbA1c:   Date               Grafts:  Date                              Continuing wound care orders and information:              Residence: home              Continue home health care with:               Your wound-care supplies will be provided by: [facility] provider:              Compression with               Off loading:  Date               XHDPY Meds:               RHYLM cleansing:                           GG not scrub or use excessive force.                          Wash hands with soap and water before and after dressing changes.                           Prior to applying a clean dressing, cleanse wound with normal saline,                          wound cleanser, or mild soap and water.                           Ask your physician or nurse before getting the wound(s) wet in the shower.              Daily Wound management:                          Keep weight off wounds and reposition every 2 hours.                          EMXDP

## 2021-03-29 ENCOUNTER — TELEPHONE (OUTPATIENT)
Dept: WOUND CARE | Age: 66
End: 2021-03-29

## 2021-03-29 NOTE — TELEPHONE ENCOUNTER
Patient states she is switching to a wound care center closer to her home. Requested we fax recent notes. Notes faxed to number provided. No need for further followup.     Electronically signed by Roxanne Tillman RN on 3/29/2021 at 11:23 AM

## 2021-08-25 ENCOUNTER — HOSPITAL ENCOUNTER (OUTPATIENT)
Age: 66
Setting detail: SPECIMEN
Discharge: HOME | End: 2021-08-25
Payer: COMMERCIAL

## 2021-08-25 LAB
DIGOXIN LEVEL: 0.8 NG/ML (ref 0.8–2)
DOSE AMOUNT: NORMAL
DOSE TIME: NORMAL

## 2021-08-25 PROCEDURE — 80162 ASSAY OF DIGOXIN TOTAL: CPT

## 2021-08-25 PROCEDURE — 36415 COLL VENOUS BLD VENIPUNCTURE: CPT

## 2021-08-27 ENCOUNTER — HOSPITAL ENCOUNTER (OUTPATIENT)
Age: 66
Setting detail: SPECIMEN
Discharge: HOME | End: 2021-08-27

## 2021-08-27 LAB
DIGOXIN LEVEL: 0.7 NG/ML (ref 0.8–2)
DOSE AMOUNT: ABNORMAL
DOSE TIME: ABNORMAL

## 2021-08-27 PROCEDURE — 36415 COLL VENOUS BLD VENIPUNCTURE: CPT

## 2021-08-27 PROCEDURE — 80162 ASSAY OF DIGOXIN TOTAL: CPT

## 2021-09-01 ENCOUNTER — HOSPITAL ENCOUNTER (OUTPATIENT)
Age: 66
Setting detail: SPECIMEN
Discharge: HOME | End: 2021-09-01

## 2021-09-01 LAB
ANION GAP SERPL CALCULATED.3IONS-SCNC: 12 MMOL/L (ref 4–16)
BUN BLDV-MCNC: 18 MG/DL (ref 6–23)
CALCIUM SERPL-MCNC: 9.2 MG/DL (ref 8.3–10.6)
CHLORIDE BLD-SCNC: 94 MMOL/L (ref 99–110)
CO2: 29 MMOL/L (ref 21–32)
CREAT SERPL-MCNC: 0.8 MG/DL (ref 0.6–1.1)
GFR AFRICAN AMERICAN: >60 ML/MIN/1.73M2
GFR NON-AFRICAN AMERICAN: >60 ML/MIN/1.73M2
GLUCOSE BLD-MCNC: 127 MG/DL (ref 70–99)
HCT VFR BLD CALC: 42.6 % (ref 37–47)
HEMOGLOBIN: 13.2 GM/DL (ref 12.5–16)
MCH RBC QN AUTO: 30.6 PG (ref 27–31)
MCHC RBC AUTO-ENTMCNC: 31 % (ref 32–36)
MCV RBC AUTO: 98.8 FL (ref 78–100)
PDW BLD-RTO: 14.9 % (ref 11.7–14.9)
PLATELET # BLD: 459 K/CU MM (ref 140–440)
PMV BLD AUTO: 10 FL (ref 7.5–11.1)
POTASSIUM SERPL-SCNC: 4.1 MMOL/L (ref 3.5–5.1)
RBC # BLD: 4.31 M/CU MM (ref 4.2–5.4)
SODIUM BLD-SCNC: 135 MMOL/L (ref 135–145)
WBC # BLD: 5.9 K/CU MM (ref 4–10.5)

## 2021-09-01 PROCEDURE — 85027 COMPLETE CBC AUTOMATED: CPT

## 2021-09-01 PROCEDURE — 36415 COLL VENOUS BLD VENIPUNCTURE: CPT

## 2021-09-01 PROCEDURE — 80048 BASIC METABOLIC PNL TOTAL CA: CPT

## 2021-09-03 ENCOUNTER — HOSPITAL ENCOUNTER (OUTPATIENT)
Age: 66
Setting detail: SPECIMEN
Discharge: HOME | End: 2021-09-03
Payer: MEDICARE

## 2021-09-03 LAB
DIGOXIN LEVEL: 2 NG/ML (ref 0.8–2)
DOSE AMOUNT: NORMAL
DOSE TIME: NORMAL

## 2021-09-03 PROCEDURE — 36415 COLL VENOUS BLD VENIPUNCTURE: CPT

## 2021-09-03 PROCEDURE — 80162 ASSAY OF DIGOXIN TOTAL: CPT

## 2021-09-15 ENCOUNTER — HOSPITAL ENCOUNTER (OUTPATIENT)
Age: 66
Setting detail: SPECIMEN
Discharge: HOME | End: 2021-09-15
Payer: MEDICARE

## 2021-09-15 LAB
ALBUMIN SERPL-MCNC: 3.4 GM/DL (ref 3.4–5)
ALP BLD-CCNC: 74 IU/L (ref 40–128)
ALT SERPL-CCNC: 9 U/L (ref 10–40)
ANION GAP SERPL CALCULATED.3IONS-SCNC: 14 MMOL/L (ref 4–16)
AST SERPL-CCNC: 17 IU/L (ref 15–37)
BILIRUB SERPL-MCNC: 0.7 MG/DL (ref 0–1)
BUN BLDV-MCNC: 20 MG/DL (ref 6–23)
CALCIUM SERPL-MCNC: 9.3 MG/DL (ref 8.3–10.6)
CHLORIDE BLD-SCNC: 95 MMOL/L (ref 99–110)
CO2: 27 MMOL/L (ref 21–32)
CREAT SERPL-MCNC: 1 MG/DL (ref 0.6–1.1)
GFR AFRICAN AMERICAN: >60 ML/MIN/1.73M2
GFR NON-AFRICAN AMERICAN: 55 ML/MIN/1.73M2
GLUCOSE BLD-MCNC: 110 MG/DL (ref 70–99)
HCT VFR BLD CALC: 45.6 % (ref 37–47)
HEMOGLOBIN: 14.1 GM/DL (ref 12.5–16)
MCH RBC QN AUTO: 30.7 PG (ref 27–31)
MCHC RBC AUTO-ENTMCNC: 30.9 % (ref 32–36)
MCV RBC AUTO: 99.3 FL (ref 78–100)
PDW BLD-RTO: 15.3 % (ref 11.7–14.9)
PLATELET # BLD: 269 K/CU MM (ref 140–440)
PMV BLD AUTO: 10.6 FL (ref 7.5–11.1)
POTASSIUM SERPL-SCNC: 4 MMOL/L (ref 3.5–5.1)
RBC # BLD: 4.59 M/CU MM (ref 4.2–5.4)
SODIUM BLD-SCNC: 136 MMOL/L (ref 135–145)
TOTAL PROTEIN: 7.5 GM/DL (ref 6.4–8.2)
TSH HIGH SENSITIVITY: 7.44 UIU/ML (ref 0.27–4.2)
WBC # BLD: 6.7 K/CU MM (ref 4–10.5)

## 2021-09-15 PROCEDURE — 80053 COMPREHEN METABOLIC PANEL: CPT

## 2021-09-15 PROCEDURE — 84443 ASSAY THYROID STIM HORMONE: CPT

## 2021-09-15 PROCEDURE — 36415 COLL VENOUS BLD VENIPUNCTURE: CPT

## 2021-09-15 PROCEDURE — 85027 COMPLETE CBC AUTOMATED: CPT

## 2021-10-04 ENCOUNTER — HOSPITAL ENCOUNTER (OUTPATIENT)
Age: 66
Setting detail: SPECIMEN
Discharge: HOME | End: 2021-10-04
Payer: MEDICARE

## 2021-10-04 LAB
ANION GAP SERPL CALCULATED.3IONS-SCNC: 13 MMOL/L (ref 4–16)
BUN BLDV-MCNC: 10 MG/DL (ref 6–23)
CALCIUM SERPL-MCNC: 9.1 MG/DL (ref 8.3–10.6)
CHLORIDE BLD-SCNC: 97 MMOL/L (ref 99–110)
CO2: 26 MMOL/L (ref 21–32)
CREAT SERPL-MCNC: 0.9 MG/DL (ref 0.6–1.1)
GFR AFRICAN AMERICAN: >60 ML/MIN/1.73M2
GFR NON-AFRICAN AMERICAN: >60 ML/MIN/1.73M2
GLUCOSE BLD-MCNC: 86 MG/DL (ref 70–99)
MAGNESIUM: 1.7 MG/DL (ref 1.8–2.4)
POTASSIUM SERPL-SCNC: 3.6 MMOL/L (ref 3.5–5.1)
SODIUM BLD-SCNC: 136 MMOL/L (ref 135–145)

## 2021-10-04 PROCEDURE — 80048 BASIC METABOLIC PNL TOTAL CA: CPT

## 2021-10-04 PROCEDURE — 83735 ASSAY OF MAGNESIUM: CPT

## 2021-10-04 PROCEDURE — 36415 COLL VENOUS BLD VENIPUNCTURE: CPT

## 2021-10-27 ENCOUNTER — HOSPITAL ENCOUNTER (OUTPATIENT)
Age: 66
Setting detail: SPECIMEN
Discharge: HOME | End: 2021-10-27
Payer: MEDICARE

## 2021-10-27 PROCEDURE — 85027 COMPLETE CBC AUTOMATED: CPT

## 2021-10-27 PROCEDURE — 80053 COMPREHEN METABOLIC PANEL: CPT

## 2021-11-05 ENCOUNTER — HOSPITAL ENCOUNTER (OUTPATIENT)
Age: 66
Setting detail: SPECIMEN
Discharge: HOME | End: 2021-11-05
Payer: MEDICARE

## 2021-11-05 LAB
ALBUMIN SERPL-MCNC: 3.1 GM/DL (ref 3.4–5)
ALP BLD-CCNC: 64 IU/L (ref 40–128)
ALT SERPL-CCNC: 6 U/L (ref 10–40)
ANION GAP SERPL CALCULATED.3IONS-SCNC: 17 MMOL/L (ref 4–16)
AST SERPL-CCNC: 12 IU/L (ref 15–37)
BILIRUB SERPL-MCNC: 0.7 MG/DL (ref 0–1)
BUN BLDV-MCNC: 9 MG/DL (ref 6–23)
CALCIUM SERPL-MCNC: 8.7 MG/DL (ref 8.3–10.6)
CEA: 3.1 NG/ML
CHLORIDE BLD-SCNC: 93 MMOL/L (ref 99–110)
CO2: 23 MMOL/L (ref 21–32)
CREAT SERPL-MCNC: 0.6 MG/DL (ref 0.6–1.1)
GFR AFRICAN AMERICAN: >60 ML/MIN/1.73M2
GFR NON-AFRICAN AMERICAN: >60 ML/MIN/1.73M2
GLUCOSE BLD-MCNC: 139 MG/DL (ref 70–99)
HCT VFR BLD CALC: 39.2 % (ref 37–47)
HEMOGLOBIN: 13.3 GM/DL (ref 12.5–16)
MCH RBC QN AUTO: 31.2 PG (ref 27–31)
MCHC RBC AUTO-ENTMCNC: 33.9 % (ref 32–36)
MCV RBC AUTO: 92 FL (ref 78–100)
PDW BLD-RTO: 14.6 % (ref 11.7–14.9)
PLATELET # BLD: 278 K/CU MM (ref 140–440)
PMV BLD AUTO: 10.4 FL (ref 7.5–11.1)
POTASSIUM SERPL-SCNC: 3.1 MMOL/L (ref 3.5–5.1)
PREALBUMIN: 12 MG/DL (ref 20–40)
RBC # BLD: 4.26 M/CU MM (ref 4.2–5.4)
SODIUM BLD-SCNC: 133 MMOL/L (ref 135–145)
TOTAL PROTEIN: 6.4 GM/DL (ref 6.4–8.2)
WBC # BLD: 6.4 K/CU MM (ref 4–10.5)

## 2021-11-05 PROCEDURE — 82378 CARCINOEMBRYONIC ANTIGEN: CPT

## 2021-11-05 PROCEDURE — 84134 ASSAY OF PREALBUMIN: CPT

## 2021-11-05 PROCEDURE — 80053 COMPREHEN METABOLIC PANEL: CPT

## 2021-11-05 PROCEDURE — 85027 COMPLETE CBC AUTOMATED: CPT

## 2021-11-05 PROCEDURE — 86304 IMMUNOASSAY TUMOR CA 125: CPT

## 2021-11-05 PROCEDURE — 36415 COLL VENOUS BLD VENIPUNCTURE: CPT

## 2021-11-07 LAB — CA 125: 39 U/ML (ref 0–35)

## 2021-11-11 NOTE — PROGRESS NOTES
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 Upper inner thighs erythremic with a history of cellulitis and rubbing in the area related to obesity. Will start Bactrim DS BID x 10 days and Lotrisone topically. Plan:     Discharge Instructions         Plan:      Discharge Instructions        PHYSICIAN ORDERS AND DISCHARGE INSTRUCTIONS     NOTE: Upon discharge from the [address], you will receive a patient experience survey. We would be grateful if you would take the time to fill this survey out.     Wound care order history:                 CARROL's   Right       Left               Date not able to obtain              Vascular studies:   Date               Imaging:   Date               Cultures:   Date               Labs/ HbA1c:   Date               Grafts:  Date               HBO:  To be determined              Antibiotics:  AUGMENTIN BID FOR 10 DAYS ON 7/24/19              Earlier Wound care treatments:                Authorizations:                        Consults:   Date                           PCP: [id number]     Continuing wound care orders and information:              Residence: home              Continue home health care with:               Your wound-care supplies will be provided by:  Vijaya Ferreira provider:              Compression with               Off loading:  Date               NJDPT Meds:               MQPXC cleansing:                           SX not scrub or use excessive force.                          Wash hands with soap and water before and after dressing changes.                         Prior to applying a clean dressing, cleanse wound with normal saline,                          wound cleanser, or mild soap and water.                           Ask your physician or nurse before getting the wound(s) wet in the shower.              Daily Wound management:                          Keep weight off wounds and reposition every 2 hours.                          Avoid standing for long periods of time.                          Apply wraps/stockings in AM and remove at bedtime.                          Elevate legs to the level of the heart or above for 30 minutes 4-5 times a day and/or when sitting.                                               When taking antibiotics take entire prescription as ordered by MD do not stop taking until medicine is all gone.                                                                 Orders for this week: 1/22/20                 Left posterior leg -- Wash with Alpha Bath. paint with betadine then apply calcium alginate to excoriated areas, cover with optifoam gentle SA 6x6. ( kerramax sacral border at home) . Cover with abd and hyperfix tape. Change daily.      Lymph Pumps will check status.  Received     Bactrim ds ordered bid times 10 days     lotrisone cream         Follow up with Sally DAS In 1 weeks in the wound care center     Call [phone] for any questions or concerns.     Date__________   Time____________                Treatment Note Wound 06/12/19 Wound #2 Left Post. Lower Leg (Onset x 2 Weeks)--lymphademia-Dressing/Treatment: (betadine paint, ca alginate, Mepilex border, hypafix tape )  Wound 01/22/20 Leg Right;Lateral #3 rt lateral lower leg-Dressing/Treatment: (alginate, abd, hyperfix tape)    Written Patient Dismissal Instructions Given            Electronically signed by MIA Ovalles CNP on 1/22/2020 at 11:44 AM

## 2021-11-13 ENCOUNTER — HOSPITAL ENCOUNTER (OUTPATIENT)
Age: 66
Setting detail: SPECIMEN
Discharge: HOME | End: 2021-11-13
Payer: MEDICARE

## 2021-11-13 PROCEDURE — 80053 COMPREHEN METABOLIC PANEL: CPT

## 2021-11-13 PROCEDURE — 85027 COMPLETE CBC AUTOMATED: CPT

## 2021-11-14 LAB
ALBUMIN SERPL-MCNC: 3.2 GM/DL (ref 3.4–5)
ALP BLD-CCNC: 68 IU/L (ref 40–129)
ALT SERPL-CCNC: 5 U/L (ref 10–40)
ANION GAP SERPL CALCULATED.3IONS-SCNC: 13 MMOL/L (ref 4–16)
AST SERPL-CCNC: 9 IU/L (ref 15–37)
BILIRUB SERPL-MCNC: 0.9 MG/DL (ref 0–1)
BUN BLDV-MCNC: 8 MG/DL (ref 6–23)
CALCIUM SERPL-MCNC: 8.2 MG/DL (ref 8.3–10.6)
CHLORIDE BLD-SCNC: 94 MMOL/L (ref 99–110)
CO2: 27 MMOL/L (ref 21–32)
CREAT SERPL-MCNC: 0.5 MG/DL (ref 0.6–1.1)
GFR AFRICAN AMERICAN: >60 ML/MIN/1.73M2
GFR NON-AFRICAN AMERICAN: >60 ML/MIN/1.73M2
GLUCOSE BLD-MCNC: 130 MG/DL (ref 70–99)
HCT VFR BLD CALC: 38.9 % (ref 37–47)
HEMOGLOBIN: 13.1 GM/DL (ref 12.5–16)
MCH RBC QN AUTO: 31 PG (ref 27–31)
MCHC RBC AUTO-ENTMCNC: 33.7 % (ref 32–36)
MCV RBC AUTO: 92 FL (ref 78–100)
PDW BLD-RTO: 14.4 % (ref 11.7–14.9)
PLATELET # BLD: 157 K/CU MM (ref 140–440)
PMV BLD AUTO: 10.1 FL (ref 7.5–11.1)
POTASSIUM SERPL-SCNC: 3.1 MMOL/L (ref 3.5–5.1)
RBC # BLD: 4.23 M/CU MM (ref 4.2–5.4)
SODIUM BLD-SCNC: 134 MMOL/L (ref 135–145)
TOTAL PROTEIN: 6.3 GM/DL (ref 6.4–8.2)
WBC # BLD: 4.8 K/CU MM (ref 4–10.5)

## 2021-11-15 ENCOUNTER — HOSPITAL ENCOUNTER (OUTPATIENT)
Age: 66
Setting detail: SPECIMEN
Discharge: HOME | End: 2021-11-15

## 2021-11-15 LAB
HCT VFR BLD CALC: 43.4 % (ref 37–47)
HEMOGLOBIN: 13.8 GM/DL (ref 12.5–16)
MCH RBC QN AUTO: 30.7 PG (ref 27–31)
MCHC RBC AUTO-ENTMCNC: 31.8 % (ref 32–36)
MCV RBC AUTO: 96.7 FL (ref 78–100)
PDW BLD-RTO: 14.3 % (ref 11.7–14.9)
PLATELET # BLD: 143 K/CU MM (ref 140–440)
PMV BLD AUTO: 10.6 FL (ref 7.5–11.1)
RBC # BLD: 4.49 M/CU MM (ref 4.2–5.4)
WBC # BLD: 5.2 K/CU MM (ref 4–10.5)

## 2021-11-15 PROCEDURE — 85027 COMPLETE CBC AUTOMATED: CPT

## 2021-11-15 PROCEDURE — 36415 COLL VENOUS BLD VENIPUNCTURE: CPT
